# Patient Record
Sex: MALE | Race: OTHER | HISPANIC OR LATINO | Employment: UNEMPLOYED | URBAN - METROPOLITAN AREA
[De-identification: names, ages, dates, MRNs, and addresses within clinical notes are randomized per-mention and may not be internally consistent; named-entity substitution may affect disease eponyms.]

---

## 2022-07-23 ENCOUNTER — HOSPITAL ENCOUNTER (EMERGENCY)
Facility: HOSPITAL | Age: 42
Discharge: HOME | End: 2022-07-23
Attending: FAMILY MEDICINE

## 2022-07-23 VITALS
HEIGHT: 70 IN | RESPIRATION RATE: 16 BRPM | OXYGEN SATURATION: 99 % | SYSTOLIC BLOOD PRESSURE: 120 MMHG | HEART RATE: 77 BPM | DIASTOLIC BLOOD PRESSURE: 80 MMHG | TEMPERATURE: 97 F | BODY MASS INDEX: 29.51 KG/M2 | WEIGHT: 206.13 LBS

## 2022-07-23 DIAGNOSIS — L03.116 CELLULITIS OF LEFT LOWER EXTREMITY: Primary | ICD-10-CM

## 2022-07-23 LAB
ANION GAP SERPL CALC-SCNC: 8 MEQ/L
BASOPHILS # BLD AUTO: 0.05 X10(3)/MCL (ref 0–0.2)
BASOPHILS NFR BLD AUTO: 0.4 %
BUN SERPL-MCNC: 14 MG/DL (ref 8.9–20.6)
CALCIUM SERPL-MCNC: 10.3 MG/DL (ref 8.4–10.2)
CHLORIDE SERPL-SCNC: 105 MMOL/L (ref 98–107)
CO2 SERPL-SCNC: 27 MMOL/L (ref 22–29)
CREAT SERPL-MCNC: 0.86 MG/DL (ref 0.73–1.18)
CREAT/UREA NIT SERPL: 16
EOSINOPHIL # BLD AUTO: 0.23 X10(3)/MCL (ref 0–0.9)
EOSINOPHIL NFR BLD AUTO: 1.7 %
ERYTHROCYTE [DISTWIDTH] IN BLOOD BY AUTOMATED COUNT: 12.8 % (ref 11.5–17)
GLUCOSE SERPL-MCNC: 89 MG/DL (ref 74–100)
HCT VFR BLD AUTO: 42.1 % (ref 42–52)
HGB BLD-MCNC: 14.3 GM/DL (ref 14–18)
IMM GRANULOCYTES # BLD AUTO: 0.08 X10(3)/MCL (ref 0–0.04)
IMM GRANULOCYTES NFR BLD AUTO: 0.6 %
LYMPHOCYTES # BLD AUTO: 2.11 X10(3)/MCL (ref 0.6–4.6)
LYMPHOCYTES NFR BLD AUTO: 15.1 %
MCH RBC QN AUTO: 30.3 PG (ref 27–31)
MCHC RBC AUTO-ENTMCNC: 34 MG/DL (ref 33–36)
MCV RBC AUTO: 89.2 FL (ref 80–94)
MONOCYTES # BLD AUTO: 1.2 X10(3)/MCL (ref 0.1–1.3)
MONOCYTES NFR BLD AUTO: 8.6 %
NEUTROPHILS # BLD AUTO: 10.3 X10(3)/MCL (ref 2.1–9.2)
NEUTROPHILS NFR BLD AUTO: 73.6 %
NRBC BLD AUTO-RTO: 0 %
PLATELET # BLD AUTO: 250 X10(3)/MCL (ref 130–400)
PMV BLD AUTO: 10.2 FL (ref 7.4–10.4)
POTASSIUM SERPL-SCNC: 4 MMOL/L (ref 3.5–5.1)
RBC # BLD AUTO: 4.72 X10(6)/MCL (ref 4.7–6.1)
SODIUM SERPL-SCNC: 140 MMOL/L (ref 136–145)
WBC # SPEC AUTO: 13.9 X10(3)/MCL (ref 4.5–11.5)

## 2022-07-23 PROCEDURE — 36415 COLL VENOUS BLD VENIPUNCTURE: CPT | Performed by: FAMILY MEDICINE

## 2022-07-23 PROCEDURE — 85025 COMPLETE CBC W/AUTO DIFF WBC: CPT | Performed by: FAMILY MEDICINE

## 2022-07-23 PROCEDURE — 99284 EMERGENCY DEPT VISIT MOD MDM: CPT

## 2022-07-23 PROCEDURE — 80048 BASIC METABOLIC PNL TOTAL CA: CPT | Performed by: FAMILY MEDICINE

## 2022-07-23 RX ORDER — HYDROCODONE BITARTRATE AND ACETAMINOPHEN 5; 325 MG/1; MG/1
1 TABLET ORAL EVERY 6 HOURS PRN
Qty: 15 TABLET | Refills: 0 | Status: ON HOLD | OUTPATIENT
Start: 2022-07-23 | End: 2023-07-17 | Stop reason: HOSPADM

## 2022-07-23 RX ORDER — SULFAMETHOXAZOLE AND TRIMETHOPRIM 800; 160 MG/1; MG/1
1 TABLET ORAL 2 TIMES DAILY
Qty: 14 TABLET | Refills: 0 | Status: SHIPPED | OUTPATIENT
Start: 2022-07-23 | End: 2022-07-30

## 2022-07-23 RX ORDER — SULFAMETHOXAZOLE AND TRIMETHOPRIM 800; 160 MG/1; MG/1
1 TABLET ORAL 2 TIMES DAILY
Qty: 14 TABLET | Refills: 0 | Status: SHIPPED | OUTPATIENT
Start: 2022-07-23 | End: 2022-07-23 | Stop reason: SDUPTHER

## 2022-07-23 RX ORDER — OMEPRAZOLE 20 MG/1
20 TABLET, DELAYED RELEASE ORAL DAILY
COMMUNITY

## 2022-07-23 NOTE — ED PROVIDER NOTES
"Encounter Date: 7/23/2022       History     Chief Complaint   Patient presents with    Abscess    Cellulitis     Pt c/o Left upper thigh redness with pain. Warm to touch, he states 1 week ago today he attempted to remove what appeared to be a "pimple" No drainage from area since.      42 year old  male presents to the ED with complaint of left thigh pain  Patient reports " about a week ago I  had a pimple on my left thigh, I popped it and and the redness continued over a week."   Patient denies diet history of diabetes.  Patient denies fever, chills, weakness, dizziness, abdominal pain,CP, sob.     The history is provided by the patient. The history is limited by a language barrier. A  was used.   Abscess   This is a new problem. Illness onset: 1 week ago. The problem has been gradually worsening. The abscess is present on the left upper leg. The pain is at a severity of 3/10. The abscess is characterized by redness. Pertinent negatives include no fever, no vomiting and no sore throat.     Review of patient's allergies indicates:  No Known Allergies  Past Medical History:   Diagnosis Date    GERD (gastroesophageal reflux disease)      History reviewed. No pertinent surgical history.  History reviewed. No pertinent family history.  Social History     Tobacco Use    Smoking status: Never Smoker    Smokeless tobacco: Never Used   Substance Use Topics    Alcohol use: Never    Drug use: Not Currently     Review of Systems   Constitutional: Negative for fever.   HENT: Negative for sore throat.    Respiratory: Negative for shortness of breath.    Cardiovascular: Negative for chest pain.   Gastrointestinal: Negative for nausea and vomiting.   Genitourinary: Negative for dysuria.   Musculoskeletal: Negative for back pain.   Skin: Positive for rash.   Neurological: Negative for weakness.       Physical Exam     Initial Vitals [07/23/22 0817]   BP Pulse Resp Temp SpO2   120/80 77 16 97.3 °F " (36.3 °C) 99 %      MAP       --         Physical Exam    Nursing note and vitals reviewed.  Constitutional: He appears well-developed and well-nourished. No distress.   HENT:   Head: Normocephalic and atraumatic.   Eyes: Conjunctivae are normal.   Cardiovascular: Regular rhythm.   Pulmonary/Chest: Breath sounds normal.   Abdominal: Abdomen is soft. Bowel sounds are normal. There is no abdominal tenderness. There is no rebound and no guarding.   Musculoskeletal:         General: Normal range of motion.        Legs:       Comments: LLE- NVI, full active ROM with no difficulty, all adjacent joints- WNL      Neurological: He is alert and oriented to person, place, and time. He has normal strength.   Skin: Skin is warm and dry.   Left anterior thigh- 6cm above knee- pinpoint shavonne noted with erythema extending around pinpoint shavonne and proximally going to anterior thigh and medial thigh.Erythema does not touch groin/penis. No streaking redness. No erythema distally.  Lesion is warm to touch and indurated. No fluctuance noted.  Consistent with cellulitis at this time.     Psychiatric: He has a normal mood and affect. His behavior is normal. Judgment and thought content normal.         ED Course   Procedures  Labs Reviewed   BASIC METABOLIC PANEL - Abnormal; Notable for the following components:       Result Value    Calcium Level Total 10.3 (*)     All other components within normal limits   CBC WITH DIFFERENTIAL - Abnormal; Notable for the following components:    WBC 13.9 (*)     Neut # 10.3 (*)     IG# 0.08 (*)     All other components within normal limits   CBC W/ AUTO DIFFERENTIAL    Narrative:     The following orders were created for panel order CBC auto differential.  Procedure                               Abnormality         Status                     ---------                               -----------         ------                     CBC with Differential[[id number]]        Abnormal            Final result                  Please view results for these tests on the individual orders.   EXTRA TUBES    Narrative:     The following orders were created for panel order EXTRA TUBES.  Procedure                               Abnormality         Status                     ---------                               -----------         ------                     Gold Top Hold[[id number]]                                    In process                   Please view results for these tests on the individual orders.   GOLD TOP HOLD          Imaging Results    None          Medications - No data to display  Medical Decision Making:   Initial Assessment:   43 y/o with no past medical history presents with left thigh infection.  Patient not ill septic appearing.  ED Management:  Reviewed labs with patient.  Strict ER precautions have been given to the patient who verbalized understanding.  Patient is to return to the ER in 2-3 days for wound re-evaluation.  Patient is to return sooner should he develop fever, worsening pain, worsening redness or any other concerns.  Patient has been informed of this via .  All questions and concerns have been addressed at this time.  Patient is stable for discharge.             ED Course as of 07/23/22 1124   Sat Jul 23, 2022   1106 WBC(!): 13.9  Reviewed labs with patient.   Leukocytosis secondary to infection.   [AK]      ED Course User Index  [AK] Selina Murdock MD             Clinical Impression:   Final diagnoses:  [L03.116] Cellulitis of left lower extremity (Primary)          ED Disposition Condition    Discharge Stable        ED Prescriptions     Medication Sig Dispense Start Date End Date Auth. Provider    sulfamethoxazole-trimethoprim 800-160mg (BACTRIM DS) 800-160 mg Tab Take 1 tablet by mouth 2 (two) times daily. for 7 days 14 tablet 7/23/2022 7/30/2022 Selina Murdock MD    HYDROcodone-acetaminophen (NORCO) 5-325 mg per tablet Take 1 tablet by mouth every 6 (six) hours as needed for Pain. 15 tablet  7/23/2022  Selina Murdock MD        Follow-up Information     Follow up With Specialties Details Why Contact Info    [facility] Emergency Dept Emergency Medicine  As needed, If symptoms worsen [facility] [id number] [phone]    [facility] Internal Medicine Schedule an appointment as soon as possible for a visit in 1 week  [facility] [id number] [phone]           Seilna Murdock MD  07/23/22 1124

## 2022-07-30 ENCOUNTER — HOSPITAL ENCOUNTER (EMERGENCY)
Facility: HOSPITAL | Age: 42
Discharge: HOME | End: 2022-07-30
Attending: INTERNAL MEDICINE

## 2022-07-30 VITALS
OXYGEN SATURATION: 99 % | HEIGHT: 70 IN | TEMPERATURE: 98 F | RESPIRATION RATE: 18 BRPM | WEIGHT: 216.06 LBS | DIASTOLIC BLOOD PRESSURE: 78 MMHG | BODY MASS INDEX: 30.93 KG/M2 | HEART RATE: 76 BPM | SYSTOLIC BLOOD PRESSURE: 125 MMHG

## 2022-07-30 DIAGNOSIS — L03.116 CELLULITIS OF LEFT LOWER EXTREMITY: Primary | ICD-10-CM

## 2022-07-30 PROCEDURE — 99284 EMERGENCY DEPT VISIT MOD MDM: CPT

## 2022-07-30 RX ORDER — DICLOFENAC SODIUM 75 MG/1
75 TABLET, DELAYED RELEASE ORAL 2 TIMES DAILY PRN
Qty: 20 TABLET | Refills: 0 | Status: SHIPPED | OUTPATIENT
Start: 2022-07-30

## 2022-07-30 RX ORDER — CEPHALEXIN 500 MG/1
500 CAPSULE ORAL EVERY 8 HOURS
Qty: 30 CAPSULE | Refills: 0 | Status: SHIPPED | OUTPATIENT
Start: 2022-07-30 | End: 2022-08-09

## 2022-07-30 NOTE — ED PROVIDER NOTES
Encounter Date: 7/30/2022       History     Chief Complaint   Patient presents with    Abscess     Abscess on lt upper leg      The patient presents for a cellulitis re-evaluation.  Previous visit(s) to the emergency department on 7/23.  Previous treatment: bactrim.  Symptoms since visit: pain mild.  The course/duration of symptoms is improving.  Associated symptoms: none.  Video  used.          Review of patient's allergies indicates:  No Known Allergies  Past Medical History:   Diagnosis Date    GERD (gastroesophageal reflux disease)      History reviewed. No pertinent surgical history.  History reviewed. No pertinent family history.  Social History     Tobacco Use    Smoking status: Never Smoker    Smokeless tobacco: Never Used   Substance Use Topics    Alcohol use: Never    Drug use: Not Currently     Review of Systems   Constitutional: Negative for fever.   HENT: Negative for sore throat.    Respiratory: Negative for shortness of breath.    Cardiovascular: Negative for chest pain.   Gastrointestinal: Negative for nausea.   Genitourinary: Negative for dysuria.   Musculoskeletal: Negative for back pain.   Skin: Positive for wound. Negative for rash.   Neurological: Negative for weakness.   Hematological: Does not bruise/bleed easily.   All other systems reviewed and are negative.      Physical Exam     Initial Vitals [07/30/22 1545]   BP Pulse Resp Temp SpO2   125/78 76 18 98.1 °F (36.7 °C) 99 %      MAP       --         Physical Exam    Nursing note and vitals reviewed.  Constitutional: He appears well-developed and well-nourished.   HENT:   Head: Normocephalic and atraumatic.   Neck: Neck supple.   Normal range of motion.  Cardiovascular: Normal rate, regular rhythm, normal heart sounds and intact distal pulses.   Pulmonary/Chest: Breath sounds normal.   Abdominal: Abdomen is soft. Bowel sounds are normal.   Musculoskeletal:         General: Normal range of motion.      Cervical back: Normal  range of motion and neck supple.     Neurological: He is alert and oriented to person, place, and time. He has normal strength.   Skin: Skin is warm and dry.   Area to L thigh with mild erythema and induration - he reports improvement, no drainage or fluctuance   Psychiatric: He has a normal mood and affect.         ED Course   Procedures  Labs Reviewed - No data to display       Imaging Results    None          Medications - No data to display  Medical Decision Making:   History:   Old Records Summarized: records from clinic visits and records from previous admission(s).  4:25 PM DISPOSITION: The patient is resting comfortably in no acute distress.  He is hemodynamically stable and is without objective evidence for acute process requiring urgent intervention or hospitalization. I provided counseling to patient with regard to condition, the treatment plan, specific conditions for return, and the importance of follow up. Detailed written and verbal instructions provided to patient and he expressed a verbal understanding of the discharge instructions and overall management plan. Reiterated the importance of medication administration and safety and advised patient to follow up with primary care provider in 3-5 days or sooner if needed.  Answered questions at this time. The patient is stable for discharge.                         Clinical Impression:   Final diagnoses:  [L03.116] Cellulitis of left lower extremity (Primary)          ED Disposition Condition    Discharge Stable        ED Prescriptions     Medication Sig Dispense Start Date End Date Auth. Provider    cephALEXin (KEFLEX) 500 MG capsule Take 1 capsule (500 mg total) by mouth every 8 (eight) hours. for 10 days 30 capsule 7/30/2022 8/9/2022 JAKE Fam    diclofenac (VOLTAREN) 75 MG EC tablet Take 1 tablet (75 mg total) by mouth 2 (two) times daily as needed (pain). 20 tablet 7/30/2022  JAKE Fam        Follow-up Information     Follow up With  Specialties Details Why Contact Info    return to ER in 3 days for a wound recheck        [facility] Emergency Dept Emergency Medicine  If symptoms worsen [address] [facility] [address] [phone]           Peter Alejo, JAKE  07/30/22 6275

## 2023-07-14 ENCOUNTER — ANESTHESIA (OUTPATIENT)
Dept: SURGERY | Facility: HOSPITAL | Age: 43
DRG: 603 | End: 2023-07-14

## 2023-07-14 ENCOUNTER — ANESTHESIA EVENT (OUTPATIENT)
Dept: SURGERY | Facility: HOSPITAL | Age: 43
DRG: 603 | End: 2023-07-14

## 2023-07-14 ENCOUNTER — HOSPITAL ENCOUNTER (EMERGENCY)
Facility: HOSPITAL | Age: 43
Discharge: ANOTHER HOSPITAL | End: 2023-07-14
Attending: INTERNAL MEDICINE

## 2023-07-14 ENCOUNTER — HOSPITAL ENCOUNTER (INPATIENT)
Facility: HOSPITAL | Age: 43
LOS: 3 days | Discharge: HOME | DRG: 603 | End: 2023-07-17
Attending: INTERNAL MEDICINE | Admitting: INTERNAL MEDICINE

## 2023-07-14 VITALS
SYSTOLIC BLOOD PRESSURE: 140 MMHG | RESPIRATION RATE: 18 BRPM | DIASTOLIC BLOOD PRESSURE: 85 MMHG | TEMPERATURE: 98 F | OXYGEN SATURATION: 100 % | BODY MASS INDEX: 31.86 KG/M2 | WEIGHT: 222.56 LBS | HEART RATE: 82 BPM

## 2023-07-14 DIAGNOSIS — L08.9: ICD-10-CM

## 2023-07-14 DIAGNOSIS — L03.119 CELLULITIS OF HAND: Primary | ICD-10-CM

## 2023-07-14 DIAGNOSIS — S61.452A: ICD-10-CM

## 2023-07-14 DIAGNOSIS — M65.849 EXTENSOR TENOSYNOVITIS OF FINGER: Primary | ICD-10-CM

## 2023-07-14 DIAGNOSIS — R79.82 ELEVATED C-REACTIVE PROTEIN (CRP): ICD-10-CM

## 2023-07-14 DIAGNOSIS — M65.9 TENOSYNOVITIS: ICD-10-CM

## 2023-07-14 DIAGNOSIS — L03.012 CELLULITIS OF LEFT RING FINGER: ICD-10-CM

## 2023-07-14 PROBLEM — L02.512 ABSCESS OF FINGER OF LEFT HAND: Status: ACTIVE | Noted: 2023-07-14

## 2023-07-14 LAB
ALBUMIN SERPL-MCNC: 3.9 G/DL (ref 3.5–5)
ALBUMIN/GLOB SERPL: 1.3 RATIO (ref 1.1–2)
ALP SERPL-CCNC: 75 UNIT/L (ref 40–150)
ALT SERPL-CCNC: 39 UNIT/L (ref 0–55)
AST SERPL-CCNC: 19 UNIT/L (ref 5–34)
BASOPHILS # BLD AUTO: 0.04 X10(3)/MCL
BASOPHILS NFR BLD AUTO: 0.2 %
BILIRUBIN DIRECT+TOT PNL SERPL-MCNC: 0.4 MG/DL
BUN SERPL-MCNC: 9.6 MG/DL (ref 8.9–20.6)
CALCIUM SERPL-MCNC: 9.6 MG/DL (ref 8.4–10.2)
CHLORIDE SERPL-SCNC: 108 MMOL/L (ref 98–107)
CO2 SERPL-SCNC: 26 MMOL/L (ref 22–29)
CREAT SERPL-MCNC: 0.84 MG/DL (ref 0.73–1.18)
CRP SERPL-MCNC: 115.6 MG/L
EOSINOPHIL # BLD AUTO: 0.13 X10(3)/MCL (ref 0–0.9)
EOSINOPHIL NFR BLD AUTO: 0.6 %
ERYTHROCYTE [DISTWIDTH] IN BLOOD BY AUTOMATED COUNT: 13.6 % (ref 11.5–17)
ERYTHROCYTE [SEDIMENTATION RATE] IN BLOOD: 24 MM/HR (ref 0–15)
GFR SERPLBLD CREATININE-BSD FMLA CKD-EPI: >60 MLS/MIN/1.73/M2
GLOBULIN SER-MCNC: 3 GM/DL (ref 2.4–3.5)
GLUCOSE SERPL-MCNC: 85 MG/DL (ref 74–100)
HCT VFR BLD AUTO: 43.6 % (ref 42–52)
HGB BLD-MCNC: 15.2 G/DL (ref 14–18)
IMM GRANULOCYTES # BLD AUTO: 0.27 X10(3)/MCL (ref 0–0.04)
IMM GRANULOCYTES NFR BLD AUTO: 1.2 %
LYMPHOCYTES # BLD AUTO: 1.81 X10(3)/MCL (ref 0.6–4.6)
LYMPHOCYTES NFR BLD AUTO: 8.2 %
MCH RBC QN AUTO: 30.8 PG (ref 27–31)
MCHC RBC AUTO-ENTMCNC: 34.9 G/DL (ref 33–36)
MCV RBC AUTO: 88.3 FL (ref 80–94)
MONOCYTES # BLD AUTO: 2.14 X10(3)/MCL (ref 0.1–1.3)
MONOCYTES NFR BLD AUTO: 9.7 %
NEUTROPHILS # BLD AUTO: 17.66 X10(3)/MCL (ref 2.1–9.2)
NEUTROPHILS NFR BLD AUTO: 80.1 %
NRBC BLD AUTO-RTO: 0 %
PLATELET # BLD AUTO: 217 X10(3)/MCL (ref 130–400)
PLATELET # BLD EST: ADEQUATE 10*3/UL
PMV BLD AUTO: 10.5 FL (ref 7.4–10.4)
POTASSIUM SERPL-SCNC: 3.6 MMOL/L (ref 3.5–5.1)
PROT SERPL-MCNC: 6.9 GM/DL (ref 6.4–8.3)
RBC # BLD AUTO: 4.94 X10(6)/MCL (ref 4.7–6.1)
RBC MORPH BLD: NORMAL
SODIUM SERPL-SCNC: 142 MMOL/L (ref 136–145)
WBC # SPEC AUTO: 22.05 X10(3)/MCL (ref 4.5–11.5)

## 2023-07-14 PROCEDURE — 94761 N-INVAS EAR/PLS OXIMETRY MLT: CPT

## 2023-07-14 PROCEDURE — 10061 I&D ABSCESS COMP/MULTIPLE: CPT | Mod: ,,, | Performed by: ORTHOPAEDIC SURGERY

## 2023-07-14 PROCEDURE — 63600175 PHARM REV CODE 636 W HCPCS: Performed by: INTERNAL MEDICINE

## 2023-07-14 PROCEDURE — 25000003 PHARM REV CODE 250: Performed by: PHYSICIAN ASSISTANT

## 2023-07-14 PROCEDURE — D9220A PRA ANESTHESIA: Mod: ,,, | Performed by: ANESTHESIOLOGY

## 2023-07-14 PROCEDURE — 63600175 PHARM REV CODE 636 W HCPCS: Performed by: ANESTHESIOLOGY

## 2023-07-14 PROCEDURE — 26011 DRAINAGE OF FINGER ABSCESS: CPT | Mod: 51,F3,, | Performed by: ORTHOPAEDIC SURGERY

## 2023-07-14 PROCEDURE — 87205 SMEAR GRAM STAIN: CPT | Performed by: ORTHOPAEDIC SURGERY

## 2023-07-14 PROCEDURE — 63600175 PHARM REV CODE 636 W HCPCS: Performed by: PHYSICIAN ASSISTANT

## 2023-07-14 PROCEDURE — 96365 THER/PROPH/DIAG IV INF INIT: CPT

## 2023-07-14 PROCEDURE — 25000003 PHARM REV CODE 250: Performed by: NURSE ANESTHETIST, CERTIFIED REGISTERED

## 2023-07-14 PROCEDURE — 36000707: Performed by: ORTHOPAEDIC SURGERY

## 2023-07-14 PROCEDURE — 99285 EMERGENCY DEPT VISIT HI MDM: CPT | Mod: 25,27

## 2023-07-14 PROCEDURE — 37000008 HC ANESTHESIA 1ST 15 MINUTES: Performed by: ORTHOPAEDIC SURGERY

## 2023-07-14 PROCEDURE — 25000003 PHARM REV CODE 250

## 2023-07-14 PROCEDURE — 85025 COMPLETE CBC W/AUTO DIFF WBC: CPT | Performed by: PHYSICIAN ASSISTANT

## 2023-07-14 PROCEDURE — 99223 1ST HOSP IP/OBS HIGH 75: CPT | Mod: 25,,,

## 2023-07-14 PROCEDURE — 36000706: Performed by: ORTHOPAEDIC SURGERY

## 2023-07-14 PROCEDURE — 99223 PR INITIAL HOSPITAL CARE,LEVL III: ICD-10-PCS | Mod: 25,,,

## 2023-07-14 PROCEDURE — 71000033 HC RECOVERY, INTIAL HOUR: Performed by: ORTHOPAEDIC SURGERY

## 2023-07-14 PROCEDURE — 10061 PR DRAIN SKIN ABSCESS COMPLIC: ICD-10-PCS | Mod: ,,, | Performed by: ORTHOPAEDIC SURGERY

## 2023-07-14 PROCEDURE — 87075 CULTR BACTERIA EXCEPT BLOOD: CPT | Performed by: ORTHOPAEDIC SURGERY

## 2023-07-14 PROCEDURE — 87070 CULTURE OTHR SPECIMN AEROBIC: CPT | Performed by: ORTHOPAEDIC SURGERY

## 2023-07-14 PROCEDURE — 63600175 PHARM REV CODE 636 W HCPCS: Performed by: NURSE ANESTHETIST, CERTIFIED REGISTERED

## 2023-07-14 PROCEDURE — 99285 EMERGENCY DEPT VISIT HI MDM: CPT | Mod: 25

## 2023-07-14 PROCEDURE — 96372 THER/PROPH/DIAG INJ SC/IM: CPT | Performed by: PHYSICIAN ASSISTANT

## 2023-07-14 PROCEDURE — 86140 C-REACTIVE PROTEIN: CPT | Performed by: PHYSICIAN ASSISTANT

## 2023-07-14 PROCEDURE — 37000009 HC ANESTHESIA EA ADD 15 MINS: Performed by: ORTHOPAEDIC SURGERY

## 2023-07-14 PROCEDURE — 11000001 HC ACUTE MED/SURG PRIVATE ROOM

## 2023-07-14 PROCEDURE — 85652 RBC SED RATE AUTOMATED: CPT | Performed by: PHYSICIAN ASSISTANT

## 2023-07-14 PROCEDURE — 80053 COMPREHEN METABOLIC PANEL: CPT | Performed by: PHYSICIAN ASSISTANT

## 2023-07-14 PROCEDURE — 87102 FUNGUS ISOLATION CULTURE: CPT | Performed by: ORTHOPAEDIC SURGERY

## 2023-07-14 PROCEDURE — D9220A PRA ANESTHESIA: ICD-10-PCS | Mod: ,,, | Performed by: ANESTHESIOLOGY

## 2023-07-14 PROCEDURE — 63600175 PHARM REV CODE 636 W HCPCS

## 2023-07-14 PROCEDURE — 26011 PR DRAIN FINGER ABSCESS,COMPLICATED: ICD-10-PCS | Mod: 51,F3,, | Performed by: ORTHOPAEDIC SURGERY

## 2023-07-14 PROCEDURE — 87040 BLOOD CULTURE FOR BACTERIA: CPT | Performed by: PHYSICIAN ASSISTANT

## 2023-07-14 PROCEDURE — 51798 US URINE CAPACITY MEASURE: CPT

## 2023-07-14 PROCEDURE — 25000003 PHARM REV CODE 250: Performed by: INTERNAL MEDICINE

## 2023-07-14 RX ORDER — ONDANSETRON 2 MG/ML
4 INJECTION INTRAMUSCULAR; INTRAVENOUS EVERY 8 HOURS PRN
Status: DISCONTINUED | OUTPATIENT
Start: 2023-07-14 | End: 2023-07-17 | Stop reason: HOSPADM

## 2023-07-14 RX ORDER — SODIUM CHLORIDE 0.9 % (FLUSH) 0.9 %
10 SYRINGE (ML) INJECTION EVERY 12 HOURS PRN
Status: DISCONTINUED | OUTPATIENT
Start: 2023-07-14 | End: 2023-07-17 | Stop reason: HOSPADM

## 2023-07-14 RX ORDER — ONDANSETRON 2 MG/ML
4 INJECTION INTRAMUSCULAR; INTRAVENOUS DAILY PRN
Status: DISCONTINUED | OUTPATIENT
Start: 2023-07-14 | End: 2023-07-14

## 2023-07-14 RX ORDER — MEPERIDINE HYDROCHLORIDE 25 MG/ML
12.5 INJECTION INTRAMUSCULAR; INTRAVENOUS; SUBCUTANEOUS EVERY 10 MIN PRN
Status: DISCONTINUED | OUTPATIENT
Start: 2023-07-14 | End: 2023-07-14 | Stop reason: HOSPADM

## 2023-07-14 RX ORDER — SODIUM CITRATE AND CITRIC ACID MONOHYDRATE 334; 500 MG/5ML; MG/5ML
SOLUTION ORAL
Status: COMPLETED
Start: 2023-07-14 | End: 2023-07-14

## 2023-07-14 RX ORDER — CLINDAMYCIN PHOSPHATE 600 MG/50ML
600 INJECTION, SOLUTION INTRAVENOUS
Status: COMPLETED | OUTPATIENT
Start: 2023-07-14 | End: 2023-07-14

## 2023-07-14 RX ORDER — KETOROLAC TROMETHAMINE 30 MG/ML
INJECTION, SOLUTION INTRAMUSCULAR; INTRAVENOUS
Status: DISCONTINUED | OUTPATIENT
Start: 2023-07-14 | End: 2023-07-14

## 2023-07-14 RX ORDER — TALC
6 POWDER (GRAM) TOPICAL NIGHTLY PRN
Status: DISCONTINUED | OUTPATIENT
Start: 2023-07-14 | End: 2023-07-17 | Stop reason: HOSPADM

## 2023-07-14 RX ORDER — PROCHLORPERAZINE EDISYLATE 5 MG/ML
5 INJECTION INTRAMUSCULAR; INTRAVENOUS EVERY 30 MIN PRN
Status: DISCONTINUED | OUTPATIENT
Start: 2023-07-14 | End: 2023-07-14 | Stop reason: HOSPADM

## 2023-07-14 RX ORDER — MIDAZOLAM HYDROCHLORIDE 1 MG/ML
2 INJECTION INTRAMUSCULAR; INTRAVENOUS
Status: CANCELLED | OUTPATIENT
Start: 2023-07-14 | End: 2023-07-14

## 2023-07-14 RX ORDER — HYDROMORPHONE HYDROCHLORIDE 2 MG/ML
0.2 INJECTION, SOLUTION INTRAMUSCULAR; INTRAVENOUS; SUBCUTANEOUS EVERY 5 MIN PRN
Status: DISCONTINUED | OUTPATIENT
Start: 2023-07-14 | End: 2023-07-14 | Stop reason: HOSPADM

## 2023-07-14 RX ORDER — SODIUM CHLORIDE 0.9 % (FLUSH) 0.9 %
10 SYRINGE (ML) INJECTION
Status: DISCONTINUED | OUTPATIENT
Start: 2023-07-14 | End: 2023-07-17 | Stop reason: HOSPADM

## 2023-07-14 RX ORDER — CLINDAMYCIN PHOSPHATE 600 MG/50ML
600 INJECTION, SOLUTION INTRAVENOUS
Status: DISCONTINUED | OUTPATIENT
Start: 2023-07-14 | End: 2023-07-14

## 2023-07-14 RX ORDER — HYDROCODONE BITARTRATE AND ACETAMINOPHEN 5; 325 MG/1; MG/1
1 TABLET ORAL EVERY 6 HOURS PRN
Status: DISCONTINUED | OUTPATIENT
Start: 2023-07-14 | End: 2023-07-15

## 2023-07-14 RX ORDER — DIPHENHYDRAMINE HYDROCHLORIDE 50 MG/ML
25 INJECTION INTRAMUSCULAR; INTRAVENOUS EVERY 6 HOURS PRN
Status: DISCONTINUED | OUTPATIENT
Start: 2023-07-14 | End: 2023-07-14 | Stop reason: HOSPADM

## 2023-07-14 RX ORDER — FENTANYL CITRATE 50 UG/ML
INJECTION, SOLUTION INTRAMUSCULAR; INTRAVENOUS
Status: DISCONTINUED | OUTPATIENT
Start: 2023-07-14 | End: 2023-07-14

## 2023-07-14 RX ORDER — HYDROMORPHONE HYDROCHLORIDE 2 MG/ML
1 INJECTION, SOLUTION INTRAMUSCULAR; INTRAVENOUS; SUBCUTANEOUS EVERY 4 HOURS PRN
Status: DISCONTINUED | OUTPATIENT
Start: 2023-07-14 | End: 2023-07-17 | Stop reason: HOSPADM

## 2023-07-14 RX ORDER — KETOROLAC TROMETHAMINE 30 MG/ML
15 INJECTION, SOLUTION INTRAMUSCULAR; INTRAVENOUS EVERY 6 HOURS PRN
Status: DISCONTINUED | OUTPATIENT
Start: 2023-07-14 | End: 2023-07-17 | Stop reason: HOSPADM

## 2023-07-14 RX ORDER — ACETAMINOPHEN 10 MG/ML
1000 INJECTION, SOLUTION INTRAVENOUS ONCE
Status: COMPLETED | OUTPATIENT
Start: 2023-07-14 | End: 2023-07-14

## 2023-07-14 RX ORDER — ACETAMINOPHEN 325 MG/1
650 TABLET ORAL EVERY 8 HOURS PRN
Status: DISCONTINUED | OUTPATIENT
Start: 2023-07-14 | End: 2023-07-17 | Stop reason: HOSPADM

## 2023-07-14 RX ORDER — CLONIDINE HYDROCHLORIDE 0.1 MG/1
0.1 TABLET ORAL EVERY 4 HOURS PRN
Status: DISCONTINUED | OUTPATIENT
Start: 2023-07-14 | End: 2023-07-17 | Stop reason: HOSPADM

## 2023-07-14 RX ORDER — KETOROLAC TROMETHAMINE 30 MG/ML
30 INJECTION, SOLUTION INTRAMUSCULAR; INTRAVENOUS
Status: COMPLETED | OUTPATIENT
Start: 2023-07-14 | End: 2023-07-14

## 2023-07-14 RX ORDER — SODIUM CITRATE AND CITRIC ACID MONOHYDRATE 334; 500 MG/5ML; MG/5ML
30 SOLUTION ORAL ONCE
Status: CANCELLED | OUTPATIENT
Start: 2023-07-14 | End: 2023-07-14

## 2023-07-14 RX ORDER — LIDOCAINE HYDROCHLORIDE 10 MG/ML
INJECTION, SOLUTION EPIDURAL; INFILTRATION; INTRACAUDAL; PERINEURAL
Status: DISCONTINUED | OUTPATIENT
Start: 2023-07-14 | End: 2023-07-14

## 2023-07-14 RX ORDER — LIDOCAINE HYDROCHLORIDE 10 MG/ML
1 INJECTION, SOLUTION EPIDURAL; INFILTRATION; INTRACAUDAL; PERINEURAL ONCE
Status: CANCELLED | OUTPATIENT
Start: 2023-07-14 | End: 2023-07-14

## 2023-07-14 RX ORDER — VANCOMYCIN HYDROCHLORIDE 1 G/20ML
INJECTION, POWDER, LYOPHILIZED, FOR SOLUTION INTRAVENOUS
Status: DISPENSED
Start: 2023-07-14 | End: 2023-07-15

## 2023-07-14 RX ORDER — SODIUM CHLORIDE, SODIUM LACTATE, POTASSIUM CHLORIDE, CALCIUM CHLORIDE 600; 310; 30; 20 MG/100ML; MG/100ML; MG/100ML; MG/100ML
INJECTION, SOLUTION INTRAVENOUS CONTINUOUS
Status: DISCONTINUED | OUTPATIENT
Start: 2023-07-14 | End: 2023-07-15

## 2023-07-14 RX ORDER — HYDROCODONE BITARTRATE AND ACETAMINOPHEN 5; 325 MG/1; MG/1
1 TABLET ORAL
Status: COMPLETED | OUTPATIENT
Start: 2023-07-14 | End: 2023-07-14

## 2023-07-14 RX ORDER — PROPOFOL 10 MG/ML
INJECTION, EMULSION INTRAVENOUS
Status: DISCONTINUED | OUTPATIENT
Start: 2023-07-14 | End: 2023-07-14

## 2023-07-14 RX ORDER — SODIUM CHLORIDE, SODIUM GLUCONATE, SODIUM ACETATE, POTASSIUM CHLORIDE AND MAGNESIUM CHLORIDE 30; 37; 368; 526; 502 MG/100ML; MG/100ML; MG/100ML; MG/100ML; MG/100ML
INJECTION, SOLUTION INTRAVENOUS CONTINUOUS
Status: CANCELLED | OUTPATIENT
Start: 2023-07-14 | End: 2023-08-13

## 2023-07-14 RX ORDER — DEXAMETHASONE SODIUM PHOSPHATE 4 MG/ML
INJECTION, SOLUTION INTRA-ARTICULAR; INTRALESIONAL; INTRAMUSCULAR; INTRAVENOUS; SOFT TISSUE
Status: DISCONTINUED | OUTPATIENT
Start: 2023-07-14 | End: 2023-07-14

## 2023-07-14 RX ADMIN — CLINDAMYCIN PHOSPHATE 600 MG: 600 INJECTION, SOLUTION INTRAVENOUS at 10:07

## 2023-07-14 RX ADMIN — ACETAMINOPHEN 1000 MG: 10 INJECTION INTRAVENOUS at 04:07

## 2023-07-14 RX ADMIN — SODIUM CHLORIDE, SODIUM GLUCONATE, SODIUM ACETATE, POTASSIUM CHLORIDE AND MAGNESIUM CHLORIDE: 526; 502; 368; 37; 30 INJECTION, SOLUTION INTRAVENOUS at 03:07

## 2023-07-14 RX ADMIN — PIPERACILLIN SODIUM AND TAZOBACTAM SODIUM 4.5 G: 4; .5 INJECTION, POWDER, LYOPHILIZED, FOR SOLUTION INTRAVENOUS at 05:07

## 2023-07-14 RX ADMIN — HYDROCODONE BITARTRATE AND ACETAMINOPHEN 1 TABLET: 5; 325 TABLET ORAL at 07:07

## 2023-07-14 RX ADMIN — FENTANYL CITRATE 100 MCG: 50 INJECTION, SOLUTION INTRAMUSCULAR; INTRAVENOUS at 03:07

## 2023-07-14 RX ADMIN — DEXTROSE 2 G: 50 INJECTION, SOLUTION INTRAVENOUS at 03:07

## 2023-07-14 RX ADMIN — DEXAMETHASONE SODIUM PHOSPHATE 4 MG: 4 INJECTION, SOLUTION INTRA-ARTICULAR; INTRALESIONAL; INTRAMUSCULAR; INTRAVENOUS; SOFT TISSUE at 03:07

## 2023-07-14 RX ADMIN — HYDROMORPHONE HYDROCHLORIDE 0.2 MG: 2 INJECTION INTRAMUSCULAR; INTRAVENOUS; SUBCUTANEOUS at 04:07

## 2023-07-14 RX ADMIN — KETOROLAC TROMETHAMINE 30 MG: 30 INJECTION, SOLUTION INTRAMUSCULAR; INTRAVENOUS at 09:07

## 2023-07-14 RX ADMIN — PROPOFOL 180 MG: 10 INJECTION, EMULSION INTRAVENOUS at 03:07

## 2023-07-14 RX ADMIN — VANCOMYCIN HYDROCHLORIDE 1000 MG: 1 INJECTION, POWDER, LYOPHILIZED, FOR SOLUTION INTRAVENOUS at 08:07

## 2023-07-14 RX ADMIN — KETOROLAC TROMETHAMINE 30 MG: 30 INJECTION, SOLUTION INTRAMUSCULAR at 04:07

## 2023-07-14 RX ADMIN — SODIUM CITRATE AND CITRIC ACID MONOHYDRATE 30 ML: 500; 334 SOLUTION ORAL at 03:07

## 2023-07-14 RX ADMIN — LIDOCAINE HYDROCHLORIDE 4 ML: 10 INJECTION, SOLUTION EPIDURAL; INFILTRATION; INTRACAUDAL; PERINEURAL at 03:07

## 2023-07-14 RX ADMIN — HYDROCODONE BITARTRATE AND ACETAMINOPHEN 1 TABLET: 5; 325 TABLET ORAL at 09:07

## 2023-07-14 RX ADMIN — VANCOMYCIN HYDROCHLORIDE 1000 MG: 1 INJECTION, POWDER, LYOPHILIZED, FOR SOLUTION INTRAVENOUS at 06:07

## 2023-07-14 NOTE — ED PROVIDER NOTES
"     Source of History:  Patient, mild limitations Moldovan speaking,  used     Chief complaint:  Insect Bite (Pt states getting bit by what he thinks is a spider. Pt went to [facility] for treatment and was sent here by [facility] to be seen by a specialist. Per the Patient)      HPI:  Sonya Henderson is a 43 y.o. male presenting with Insect Bite (Pt states getting bit by what he thinks is a spider. Pt went to [facility] for treatment and was sent here by [facility] to be seen by a specialist. Per the Patient)       42yo M employed as , presents as ED to ED transfer for ortho services, Dr Hendricks. Dx with extensor tenosynovitis at [facility] ED. Given clindamycin 600 mg, tetanus UTD. 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   Patient presents for evaluation of a possible skin infection. Onset of symptoms was 3 days ago, with rapidly worsening symptoms since that time. Symptoms include moderate pain and erythema located left hand . There is a history of trauma to the area. Treatment to date has included  see above      Review of Systems   Constitutional symptoms:  Negative except as documented in HPI.   Skin symptoms:  Negative except as documented in HPI.   HEENT symptoms:  Negative except as documented in HPI.   Respiratory symptoms:  Negative except as documented in HPI.   Cardiovascular symptoms:  Negative except as documented in HPI.   Gastrointestinal symptoms:  Negative except as documented in HPI.    Genitourinary symptoms:  Negative except as documented in HPI.   Musculoskeletal symptoms:  Negative except as documented in HPI.   Neurologic symptoms:  Negative except as documented in HPI.   Psychiatric symptoms:  Negative except as documented in HPI.   Allergy/immunologic symptoms:  Negative except as documented in HPI.             Additional review of systems information: All other systems reviewed and otherwise " "negative.      Review of patient's allergies indicates:  No Known Allergies    PMH:  As per HPI and below:    Past Medical History:   Diagnosis Date    GERD (gastroesophageal reflux disease)         No family history on file.    No past surgical history on file.    Social History     Tobacco Use    Smoking status: Never    Smokeless tobacco: Never   Substance Use Topics    Alcohol use: Never    Drug use: Not Currently       There is no problem list on file for this patient.       Physical Exam:    BP (!) 142/77   Pulse 78   Temp 98.2 °F (36.8 °C)   Resp 20   Ht 5' 8" (1.727 m)   Wt 100.7 kg (222 lb)   SpO2 99%   BMI 33.75 kg/m²     Nursing note and vital signs reviewed.    General:  Alert, no acute distress.   Skin: No cyanosis, see picture below, +abscess left ring finger with impressive cellulitis into dorsal surface of hand  HEENT: Normocephalic and atraumatic, Vision unchanged, Pupils symmetric, No icterus , Nasal mucosa is pink and moist  Cardiovascular:  Regular rate and rhythm, No edema  Chest Wall: No deformity, equal chest rise  Respiratory:  Lungs are clear to auscultation, respirations are non-labored.    Musculoskeletal:  No deformity, +extensor deficit of left ring finger  Gastrointestinal:  Soft, Non distended  Neurological:  Alert and oriented, normal motor observed, normal speech observed.    Psychiatric:  Cooperative, appropriate mood & affect.         Media Information        Labs that have been ordered have been independently reviewed and interpreted by myself.     Old Chart Reviewed.      Initial Impression/ Differential Dx:  Cellulitis, abscess, necrotizing fasciitis, osteomyelitis, septic joint, MRSA, allergic/contact dermatitis, local trauma/contusion      MDM:      Reviewed Nurses Note.    Reviewed Pertinent old records.    Orders Placed This Encounter    Admit to Inpatient    Case Request Operating Room: IRRIGATION AND DEBRIDEMENT, UPPER EXTREMITY                    Labs Reviewed - No " data to display       No orders to display        Admission on 07/14/2023, Discharged on 07/14/2023   Component Date Value Ref Range Status    Sodium Level 07/14/2023 142  136 - 145 mmol/L Final    Potassium Level 07/14/2023 3.6  3.5 - 5.1 mmol/L Final    Chloride 07/14/2023 108 (H)  98 - 107 mmol/L Final    Carbon Dioxide 07/14/2023 26  22 - 29 mmol/L Final    Glucose Level 07/14/2023 85  74 - 100 mg/dL Final    Blood Urea Nitrogen 07/14/2023 9.6  8.9 - 20.6 mg/dL Final    Creatinine 07/14/2023 0.84  0.73 - 1.18 mg/dL Final    Calcium Level Total 07/14/2023 9.6  8.4 - 10.2 mg/dL Final    Protein Total 07/14/2023 6.9  6.4 - 8.3 gm/dL Final    Albumin Level 07/14/2023 3.9  3.5 - 5.0 g/dL Final    Globulin 07/14/2023 3.0  2.4 - 3.5 gm/dL Final    Albumin/Globulin Ratio 07/14/2023 1.3  1.1 - 2.0 ratio Final    Bilirubin Total 07/14/2023 0.4  <=1.5 mg/dL Final    Alkaline Phosphatase 07/14/2023 75  40 - 150 unit/L Final    Alanine Aminotransferase 07/14/2023 39  0 - 55 unit/L Final    Aspartate Aminotransferase 07/14/2023 19  5 - 34 unit/L Final    eGFR 07/14/2023 >60  mls/min/1.73/m2 Final    Sed Rate 07/14/2023 24 (H)  0 - 15 mm/hr Final    C-Reactive Protein 07/14/2023 115.60 (H)  <5.00 mg/L Final    WBC 07/14/2023 22.05 (H)  4.50 - 11.50 x10(3)/mcL Final    RBC 07/14/2023 4.94  4.70 - 6.10 x10(6)/mcL Final    Hgb 07/14/2023 15.2  14.0 - 18.0 g/dL Final    Hct 07/14/2023 43.6  42.0 - 52.0 % Final    MCV 07/14/2023 88.3  80.0 - 94.0 fL Final    MCH 07/14/2023 30.8  27.0 - 31.0 pg Final    MCHC 07/14/2023 34.9  33.0 - 36.0 g/dL Final    RDW 07/14/2023 13.6  11.5 - 17.0 % Final    Platelet 07/14/2023 217  130 - 400 x10(3)/mcL Final    MPV 07/14/2023 10.5 (H)  7.4 - 10.4 fL Final    Neut % 07/14/2023 80.1  % Final    Lymph % 07/14/2023 8.2  % Final    Mono % 07/14/2023 9.7  % Final    Eos % 07/14/2023 0.6  % Final    Basophil % 07/14/2023 0.2  % Final    Lymph # 07/14/2023 1.81  0.6 - 4.6 x10(3)/mcL Final    Neut #  07/14/2023 17.66 (H)  2.1 - 9.2 x10(3)/mcL Final    Mono # 07/14/2023 2.14 (H)  0.1 - 1.3 x10(3)/mcL Final    Eos # 07/14/2023 0.13  0 - 0.9 x10(3)/mcL Final    Baso # 07/14/2023 0.04  <=0.2 x10(3)/mcL Final    IG# 07/14/2023 0.27 (H)  0 - 0.04 x10(3)/mcL Final    IG% 07/14/2023 1.2  % Final    NRBC% 07/14/2023 0.0  % Final    RBC Morph 07/14/2023 Normal  Normal Final    Platelets 07/14/2023 Adequate  Normal, Adequate Final       Imaging Results    None                                              Diagnostic Impression:    1. Extensor tenosynovitis of finger    2. Infection of hand due to bite, left, initial encounter    3. Cellulitis of left ring finger    4. Elevated C-reactive protein (CRP)         ED Disposition Condition    Admit Stable                       José Antonio Wiseman, DO  07/14/23 1141

## 2023-07-14 NOTE — PROGRESS NOTES
Pharmacokinetic Initial Assessment: IV Vancomycin    Assessment/Plan:    Initiate intravenous vancomycin with loading dose of 2000 mg once followed by a maintenance dose of vancomycin 1500mg IV every 12 hours  Desired empiric serum trough concentration is 10 to 20 mcg/mL  Draw vancomycin trough on 7/16/23 at 0700 before 4th dose.  Pharmacy will continue to follow and monitor vancomycin.         Patient brief summary:  Sonya Henderson is a 43 y.o. male initiated on antimicrobial therapy with IV Vancomycin for treatment of suspected skin & soft tissue infection    Drug Allergies:   Review of patient's allergies indicates:  No Known Allergies    Actual Body Weight:   100.7 kg    Renal Function:   Estimated Creatinine Clearance: 133.1 mL/min (based on SCr of 0.84 mg/dL).,     Dialysis Method (if applicable):  N/A    CBC (last 72 hours):  Recent Labs   Lab Result Units 07/14/23  1001   WBC x10(3)/mcL 22.05*   Hgb g/dL 15.2   Hct % 43.6   Platelet x10(3)/mcL 217   Mono % % 9.7   Eos % % 0.6   Basophil % % 0.2       Metabolic Panel (last 72 hours):  Recent Labs   Lab Result Units 07/14/23  1001   Sodium Level mmol/L 142   Potassium Level mmol/L 3.6   Chloride mmol/L 108*   Carbon Dioxide mmol/L 26   Glucose Level mg/dL 85   Blood Urea Nitrogen mg/dL 9.6   Creatinine mg/dL 0.84   Albumin Level g/dL 3.9   Bilirubin Total mg/dL 0.4   Alkaline Phosphatase unit/L 75   Aspartate Aminotransferase unit/L 19   Alanine Aminotransferase unit/L 39       Drug levels (last 3 results):  No results for input(s): VANCOMYCINRA, VANCORANDOM, VANCOMYCINPE, VANCOPEAK, VANCOMYCINTR, VANCOTROUGH in the last 72 hours.    Microbiologic Results:  Microbiology Results (last 7 days)       Procedure Component Value Units Date/Time    Fungal Culture [[id number]] Collected: 07/14/23 1547    Order Status: Sent Specimen: Wound from Finger, Left Hand Updated: 07/14/23 1613    Anaerobic Culture [[id number]] Collected: 07/14/23 1547    Order Status:  Sent Specimen: Wound from Finger, Left Hand Updated: 07/14/23 1613    Wound Culture [[id number]] Collected: 07/14/23 1547    Order Status: Sent Specimen: Wound from Finger, Left Hand Updated: 07/14/23 1613    Gram Stain [[id number]] Collected: 07/14/23 1547    Order Status: Sent Specimen: Wound from Finger, Left Hand Updated: 07/14/23 1613    Fungal Culture [[id number]] Collected: 07/14/23 1548    Order Status: Sent Specimen: Wound from Finger, Left Hand Updated: 07/14/23 1613    Anaerobic Culture [[id number]] Collected: 07/14/23 1548    Order Status: Sent Specimen: Wound from Finger, Left Hand Updated: 07/14/23 1613    Wound Culture [[id number]] Collected: 07/14/23 1548    Order Status: Sent Specimen: Wound from Finger, Left Hand Updated: 07/14/23 1613    Gram Stain [[id number]] Collected: 07/14/23 1548    Order Status: Sent Specimen: Wound from Finger, Left Hand Updated: 07/14/23 1613    Fungal Culture [[id number]] Collected: 07/14/23 1548    Order Status: Sent Specimen: Wound from Hand, Left Updated: 07/14/23 1613    Anaerobic Culture [[id number]] Collected: 07/14/23 1548    Order Status: Sent Specimen: Wound from Hand, Left Updated: 07/14/23 1613    Wound Culture [[id number]] Collected: 07/14/23 1548    Order Status: Sent Specimen: Wound from Hand, Left Updated: 07/14/23 1613    Gram Stain [[id number]] Collected: 07/14/23 1548    Order Status: Sent Specimen: Wound from Hand, Left Updated: 07/14/23 1613

## 2023-07-14 NOTE — OP NOTE
07/14/2023    PRIMARY SURGEON: Azael Hendricks MD    ASSISTANT:  Meryl Royal NP was imperative to the critical parts of the surgical case.  This included the surgical approach and dissection, retraction, placement of hardware and implants, and closure.    PREOPERATIVE DIAGNOSIS:  Left hand deep soft tissue infection  Left ring finger abscess/infection    POSTOPERATIVE DIAGNOSIS:  Same    PROCEDURES:  Incision/drainage/irrigation/debridement of left dorsal hand  Incision/drainage/irrigation/debridement of left ring finger abscess    ANESTHESIA:  General    BLOOD LOSS:  Less than 5 ml    DVT PROPHYLAXIS:  None indicated    OUTCOME:  Patient tolerated treatment/procedure well without complications and is now ready for discharge.    DISPOSITION: Home/SelfCare    FOLLOW UP: In clinic    INSTRUMENTATION:  * No implants in log *  None    PROCEDURE IN DETAIL:    This patient was brought to the room placed on the table and the left upper extremity was prepped and draped in a normal sterile fashion.  A time-out procedure was performed.    The extremity was exsanguinated and placed a joint 50 mmHg.  I initially started with the already open track along the dorsal aspect of the left ring finger.  After extending the track proximally distally I then took scissors and expose the subcutaneous tissue.  There was purulent material.  Cultures were taken of this material.  I then irrigated the area with normal saline and also curetted and debrided the infection soft tissues.  I then closed the finger with chromic gut suture.    I then turned my attention to the dorsum of the hand.  I made a 5 cm dorsal longitudinal incision over the ring finger metacarpal.  After excising through the subcutaneous tissue there was obvious subcutaneous pus.  I then debrided the area with curette and a rongeur and then thoroughly irrigated the wound with normal saline.  I placed vancomycin powder in the wound.  I then closed the wound with 3-0 nylon  suture.    Plan for this patient is to be admitted to the hospital and continue with IV antibiotics.

## 2023-07-14 NOTE — ED TRIAGE NOTES
Pt states getting bit by what he thinks is a spider. Pt went to [facility] for treatment and was sent here by [facility] to be seen by a specialist.  Pt states having pain 8/10. Per the Patient

## 2023-07-14 NOTE — ANESTHESIA PREPROCEDURE EVALUATION
07/14/2023  Sonya Henderson is a 43 y.o., male.      Sonya Henderson    Pre-op Diagnosis: Infection of hand due to bite, left, initial encounter [S61.452A, L08.9]    Procedure(s):  IRRIGATION AND DEBRIDEMENT, UPPER EXTREMITY     Review of patient's allergies indicates:  No Known Allergies    Current Outpatient Medications   Medication Instructions    diclofenac (VOLTAREN) 75 mg, Oral, 2 times daily PRN    HYDROcodone-acetaminophen (NORCO) 5-325 mg per tablet 1 tablet, Oral, Every 6 hours PRN    omeprazole (PRILOSEC OTC) 20 mg, Oral, Daily           Past Medical History:   Diagnosis Date    GERD (gastroesophageal reflux disease)      PSH - NEG  FH GA problems - NEG  NPO since 0700 liquids (coke)  NPO since MN solids    Recent Labs   Lab 07/14/23  1001   WBC 22.05*   RBC 4.94   HGB 15.2   HCT 43.6   MCV 88.3   MCH 30.8   MCHC 34.9   RDW 13.6      MPV 10.5*       Recent Labs   Lab 07/14/23  1001      K 3.6   CO2 26   BUN 9.6   CREATININE 0.84   CALCIUM 9.6   ALBUMIN 3.9   ALKPHOS 75   ALT 39   AST 19   BILITOT 0.4       Pre-op Assessment    I have reviewed the Patient Summary Reports.    I have reviewed the NPO Status.   I have reviewed the Medications.     Review of Systems  Anesthesia Hx:  No problems with previous Anesthesia  Denies Family Hx of Anesthesia complications.   Denies Personal Hx of Anesthesia complications.   Social:  Non-Smoker    Cardiovascular:   Exercise tolerance: good  Denies Angina.  Denies Orthopnea.  Denies PND.  Denies PACHECO.  Functional Capacity good / => 4 METS    Hepatic/GI:   GERD    Musculoskeletal:  Musculoskeletal Normal    Neurological:   Denies TIA. Denies CVA.    Psych:  Psychiatric Normal           Physical Exam  General: Well nourished, Alert and Oriented    Airway:  Mallampati: III   Mouth Opening: Normal  TM Distance: Normal  Tongue:  Normal  Neck ROM: Normal ROM    Dental:  Intact    Chest/Lungs:  Clear to auscultation    Heart:  Rate: Normal  Rhythm: Regular Rhythm  No pretibial edema  No carotid bruits      Anesthesia Plan  Type of Anesthesia, risks & benefits discussed:    Anesthesia Type: Gen Supraglottic Airway  Intra-op Monitoring Plan: Standard ASA Monitors  Post Op Pain Control Plan: multimodal analgesia  Induction:  IV  Airway Plan: Direct, Post-Induction  Informed Consent: Informed consent signed with the Patient and all parties understand the risks and agree with anesthesia plan.  All questions answered. Patient consented to blood products? Yes  ASA Score: 2  Day of Surgery Review of History & Physical: H&P Update referred to the surgeon/provider.    Ready For Surgery From Anesthesia Perspective.     .

## 2023-07-14 NOTE — ANESTHESIA PROCEDURE NOTES
Intubation    Date/Time: 7/14/2023 3:22 PM  Performed by: José Antonio Wilkins CRNA  Authorized by: Richard Ferreira MD     Intubation:     Induction:  Intravenous    Mask Ventilation:  Not attempted    Attempts:  1    Attempted By:  CRNA    Difficult Airway Encountered?: No      Complications:  None    Airway Device:  Supraglottic airway/LMA    Airway Device Size:  4.0    Style/Cuff Inflation:  Cuffed (inflated to minimal occlusive pressure)    Inflation Amount (mL):  20    Placement Verified By:  Capnometry    Complicating Factors:  None    Findings Post-Intubation:  BS equal bilateral and atraumatic/condition of teeth unchanged

## 2023-07-14 NOTE — TRANSFER OF CARE
"Anesthesia Transfer of Care Note    Patient: Sonya Henderson    Procedure(s) Performed: Procedure(s) (LRB):  IRRIGATION AND DEBRIDEMENT, UPPER EXTREMITY (Left)    Patient location: PACU    Anesthesia Type: general    Transport from OR: Transported from OR on room air with adequate spontaneous ventilation    Post pain: adequate analgesia    Post assessment: no apparent anesthetic complications    Post vital signs: stable    Level of consciousness: sedated    Nausea/Vomiting: no nausea/vomiting    Complications: none    Transfer of care protocol was followed      Last vitals:   Visit Vitals  /82   Pulse 78   Temp 36.8 °C (98.2 °F)   Resp 20   Ht 5' 8" (1.727 m)   Wt 100.7 kg (222 lb)   SpO2 99%   BMI 33.75 kg/m²     "

## 2023-07-14 NOTE — H&P
[facility]    [facility] Medicine History & Physical Examination       Patient Name: Sonya Henderson  MRN: [id number]  Patient Class: IP- Inpatient   Admission Date: 7/14/2023   Admitting Physician: [place] Service   Length of Stay: 0  Attending Physician: Fermín Salcedo MD  Primary Care Provider: Primary Doctor No  Face-to-Face encounter date: 07/14/2023    Code Status: Full Code    Chief Complaint: Insect Bite (Pt states getting bit by what he thinks is a spider. Pt went to [facility] for treatment and was sent here by [facility] to be seen by a specialist. Per the Patient)          HISTORY OF PRESENT ILLNESS:   Sonya Henderson is a 43 y.o. male who  has a past medical history of GERD (gastroesophageal reflux disease).. The patient presented to [facility] on 7/14/2023 with a primary complaint of pain and swelling of the 4th finger on his left hand. It started 3 days ago. He works as a , he isn't sure it he had an insect bite. He reports prior skin abscesses on 2 other occasions also on his hands. He went to the OR for I&D this afternoon. He has no other c/o.    PAST MEDICAL HISTORY:     Past Medical History:   Diagnosis Date    GERD (gastroesophageal reflux disease)        PAST SURGICAL HISTORY:   History reviewed. No pertinent surgical history.    ALLERGIES:   Patient has no known allergies.    FAMILY HISTORY:   family history is not on file.  Denies  SOCIAL HISTORY:     Social History     Tobacco Use    Smoking status: Never    Smokeless tobacco: Never   Substance Use Topics    Alcohol use: Never        HOME MEDICATIONS:     Prior to Admission medications    Medication Sig Start Date End Date Taking? Authorizing Provider   diclofenac (VOLTAREN) 75 MG EC tablet Take 1 tablet (75 mg total) by mouth 2 (two) times daily as needed (pain). 7/30/22   Peter Alejo, ACNNICOLA   HYDROcodone-acetaminophen (NORCO) 5-325 mg per tablet Take 1 tablet by mouth every 6 (six) hours as needed  for Pain. 7/23/22   Selina Murdock MD   omeprazole (PRILOSEC OTC) 20 MG tablet Take 20 mg by mouth once daily.    Historical Provider       REVIEW OF SYSTEMS:   Except as documented, all other systems reviewed and negative   Review of Systems   Constitutional:  Negative for chills and fever.   Cardiovascular:  Negative for chest pain and palpitations.   Gastrointestinal:  Negative for abdominal pain, nausea and vomiting.   Neurological:  Negative for focal weakness and weakness.   All other systems reviewed and are negative.      PHYSICAL EXAM:     VITAL SIGNS: 24 HRS MIN & MAX LAST   Temp  Min: 98.1 °F (36.7 °C)  Max: 98.5 °F (36.9 °C) 98.5 °F (36.9 °C)   BP  Min: 118/96  Max: 155/96 (!) 152/85   Pulse  Min: 78  Max: 113  94   Resp  Min: 18  Max: 20 18   SpO2  Min: 92 %  Max: 100 % 95 %       General appearance: Well-developed, well-nourished male in no apparent distress.  HENT: Atraumatic head. Moist mucous membranes of oral cavity.  Eyes: Normal extraocular movements.   Neck: Supple.   Lungs: Clear to auscultation bilaterally. No wheezing present.   Heart: Regular rate and rhythm. S1 and S2 present with no murmurs/gallop/rub. No pedal edema. No JVD present.   Abdomen: Soft, non-distended, non-tender. No rebound tenderness/guarding. Bowel sounds are normal.   Extremities: No cyanosis, clubbing, or edema. Left hand bandaged, arm in sling  Skin: abscess to left 4th finger and cellulitis to left hand -see ER pic  Neuro: Motor and sensory exams grossly intact. Good tone.   Psych/mental status: Appropriate mood and affect. Responds appropriately to questions.     LABS AND IMAGING:     Recent Labs   Lab 07/14/23  1001   WBC 22.05*   RBC 4.94   HGB 15.2   HCT 43.6   MCV 88.3   MCH 30.8   MCHC 34.9   RDW 13.6      MPV 10.5*       Recent Labs   Lab 07/14/23  1001      K 3.6   CO2 26   BUN 9.6   CREATININE 0.84   CALCIUM 9.6   ALBUMIN 3.9   ALKPHOS 75   ALT 39   AST 19   BILITOT 0.4     Recent Results (from the  past 24 hour(s))   Comprehensive metabolic panel    Collection Time: 07/14/23 10:01 AM   Result Value Ref Range    Sodium Level 142 136 - 145 mmol/L    Potassium Level 3.6 3.5 - 5.1 mmol/L    Chloride 108 (H) 98 - 107 mmol/L    Carbon Dioxide 26 22 - 29 mmol/L    Glucose Level 85 74 - 100 mg/dL    Blood Urea Nitrogen 9.6 8.9 - 20.6 mg/dL    Creatinine 0.84 0.73 - 1.18 mg/dL    Calcium Level Total 9.6 8.4 - 10.2 mg/dL    Protein Total 6.9 6.4 - 8.3 gm/dL    Albumin Level 3.9 3.5 - 5.0 g/dL    Globulin 3.0 2.4 - 3.5 gm/dL    Albumin/Globulin Ratio 1.3 1.1 - 2.0 ratio    Bilirubin Total 0.4 <=1.5 mg/dL    Alkaline Phosphatase 75 40 - 150 unit/L    Alanine Aminotransferase 39 0 - 55 unit/L    Aspartate Aminotransferase 19 5 - 34 unit/L    eGFR >60 mls/min/1.73/m2   Sedimentation Rate    Collection Time: 07/14/23 10:01 AM   Result Value Ref Range    Sed Rate 24 (H) 0 - 15 mm/hr   C-reactive protein    Collection Time: 07/14/23 10:01 AM   Result Value Ref Range    C-Reactive Protein 115.60 (H) <5.00 mg/L   CBC with Differential    Collection Time: 07/14/23 10:01 AM   Result Value Ref Range    WBC 22.05 (H) 4.50 - 11.50 x10(3)/mcL    RBC 4.94 4.70 - 6.10 x10(6)/mcL    Hgb 15.2 14.0 - 18.0 g/dL    Hct 43.6 42.0 - 52.0 %    MCV 88.3 80.0 - 94.0 fL    MCH 30.8 27.0 - 31.0 pg    MCHC 34.9 33.0 - 36.0 g/dL    RDW 13.6 11.5 - 17.0 %    Platelet 217 130 - 400 x10(3)/mcL    MPV 10.5 (H) 7.4 - 10.4 fL    Neut % 80.1 %    Lymph % 8.2 %    Mono % 9.7 %    Eos % 0.6 %    Basophil % 0.2 %    Lymph # 1.81 0.6 - 4.6 x10(3)/mcL    Neut # 17.66 (H) 2.1 - 9.2 x10(3)/mcL    Mono # 2.14 (H) 0.1 - 1.3 x10(3)/mcL    Eos # 0.13 0 - 0.9 x10(3)/mcL    Baso # 0.04 <=0.2 x10(3)/mcL    IG# 0.27 (H) 0 - 0.04 x10(3)/mcL    IG% 1.2 %    NRBC% 0.0 %   Blood Smear Microscopic Exam    Collection Time: 07/14/23 10:01 AM   Result Value Ref Range    RBC Morph Normal Normal    Platelets Adequate Normal, Adequate       Microbiology Results (last 7 days)        Procedure Component Value Units Date/Time    Fungal Culture [[id number]] Collected: 07/14/23 1547    Order Status: Sent Specimen: Wound from Finger, Left Hand Updated: 07/14/23 1613    Anaerobic Culture [[id number]] Collected: 07/14/23 1547    Order Status: Sent Specimen: Wound from Finger, Left Hand Updated: 07/14/23 1613    Wound Culture [[id number]] Collected: 07/14/23 1547    Order Status: Sent Specimen: Wound from Finger, Left Hand Updated: 07/14/23 1613    Gram Stain [[id number]] Collected: 07/14/23 1547    Order Status: Sent Specimen: Wound from Finger, Left Hand Updated: 07/14/23 1613    Fungal Culture [[id number]] Collected: 07/14/23 1548    Order Status: Sent Specimen: Wound from Finger, Left Hand Updated: 07/14/23 1613    Anaerobic Culture [[id number]] Collected: 07/14/23 1548    Order Status: Sent Specimen: Wound from Finger, Left Hand Updated: 07/14/23 1613    Wound Culture [[id number]] Collected: 07/14/23 1548    Order Status: Sent Specimen: Wound from Finger, Left Hand Updated: 07/14/23 1613    Gram Stain [[id number]] Collected: 07/14/23 1548    Order Status: Sent Specimen: Wound from Finger, Left Hand Updated: 07/14/23 1613    Fungal Culture [[id number]] Collected: 07/14/23 1548    Order Status: Sent Specimen: Wound from Hand, Left Updated: 07/14/23 1613    Anaerobic Culture [[id number]] Collected: 07/14/23 1548    Order Status: Sent Specimen: Wound from Hand, Left Updated: 07/14/23 1613    Wound Culture [[id number]] Collected: 07/14/23 1548    Order Status: Sent Specimen: Wound from Hand, Left Updated: 07/14/23 1613    Gram Stain [[id number]] Collected: 07/14/23 1548    Order Status: Sent Specimen: Wound from Hand, Left Updated: 07/14/23 1613             X-Ray Hand 3 view Left  Narrative: EXAMINATION:  XR HAND COMPLETE 3 VIEW LEFT    CLINICAL HISTORY:  pain and swelling, suspect insect bite; worse in 4th finger;    TECHNIQUE:  Radiographs of the left hand with AP, lateral and oblique  views.    COMPARISON:  No  prior imaging available for comparison    FINDINGS:  There is no acute fracture, subluxation or dislocation.    Joints and interspaces appear maintained.    Osseous structures show normal bone mineral density.    Diffuse soft tissue edema.    There are no radiopaque foreign bodies.  Impression: Diffuse soft tissue edema is noted with no acute osseous abnormality.    Electronically signed by: Cristopher Pop  Date:    07/14/2023  Time:    11:46        __________________________________________________________________________  INPATIENT LIST OF MEDICATIONS     Scheduled Meds:   piperacillin-tazobactam (Zosyn) IV (PEDS and ADULTS) (extended infusion is not appropriate)  4.5 g Intravenous Q8H    vancomycin (VANCOCIN) IV (PEDS and ADULTS)  1,000 mg Intravenous Once    And    vancomycin (VANCOCIN) IV (PEDS and ADULTS)  1,000 mg Intravenous Once    vancomycin        [START ON 7/15/2023] vancomycin (VANCOCIN) IV (PEDS and ADULTS)  1,500 mg Intravenous Q12H     Continuous Infusions:   lactated ringers       PRN Meds:acetaminophen, cloNIDine, HYDROcodone-acetaminophen, HYDROmorphone, ketorolac, melatonin, ondansetron, sodium chloride 0.9%, sodium chloride 0.9%, Pharmacy to dose Vancomycin consult **AND** vancomycin - pharmacy to dose          ASSESSMENT & PLAN:     Left hand abscess  Elevated blood pressure    Plan  Cont zosyn and vancomycin  Add prn clonidine for elevated pressure  Check am labs        Fermín Salcedo MD   07/14/2023

## 2023-07-14 NOTE — NURSING
Patient requested that his wife be the . She agreed to translate instructions and stay at bedside during this stay.

## 2023-07-14 NOTE — ANESTHESIA POSTPROCEDURE EVALUATION
Anesthesia Post Evaluation    Patient: Sonya Henderson    Procedure(s) Performed: Procedure(s) (LRB):  IRRIGATION AND DEBRIDEMENT, UPPER EXTREMITY (Left)    Final Anesthesia Type: general      Patient location during evaluation: PACU  Patient participation: Yes- Able to Participate  Level of consciousness: awake and alert and oriented  Post-procedure vital signs: reviewed and stable  Pain management: adequate  Airway patency: patent    PONV status at discharge: No PONV  Anesthetic complications: no      Cardiovascular status: hemodynamically stable  Respiratory status: unassisted  Hydration status: euvolemic  Follow-up not needed.          Vitals Value Taken Time   /96 07/14/23 1626   Temp 36.8 07/14/23 1626   Pulse 99 07/14/23 1626   Resp 16 07/14/23 1626   SpO2 96 % 07/14/23 1626   Vitals shown include unvalidated device data.      No case tracking events are documented in the log.      Pain/Melony Score: Pain Rating Prior to Med Admin: 4 (7/14/2023  4:22 PM)  Pain Rating Post Med Admin: 8 (7/14/2023 10:03 AM)  Melony Score: 9 (7/14/2023  4:25 PM)

## 2023-07-14 NOTE — NURSING
Nurses Note -- 4 Eyes      7/14/2023   5:30 PM      Skin assessed during: Admit      [x] No Altered Skin Integrity Present    [x]Prevention Measures Documented      [] Yes- Altered Skin Integrity Present or Discovered   [] LDA Added if Not in Epic (Describe Wound)   [] New Altered Skin Integrity was Present on Admit and Documented in LDA   [] Wound Image Taken    Wound Care Consulted? No    Attending Nurse:  KAMERON Cedeno RN/Staff Member:  Betina Gonzalez RN

## 2023-07-14 NOTE — ED PROVIDER NOTES
Encounter Date: 7/14/2023       History     Chief Complaint   Patient presents with    Hand Pain     Left hand swelling that started 3 days ago. Patient believes to be spider bite. Pain rated 8/10     Patient reports pain and swelling of the 4th finger and hand for the past x3 days after a possible insect/spider bite occurred while working on a car (pt is a ); pt reports his tetanus is UTD    The history is provided by the patient.   Hand Pain  This is a new problem. The current episode started more than 2 days ago. The problem has been rapidly worsening. Pertinent negatives include no chest pain, no abdominal pain and no shortness of breath.   Review of patient's allergies indicates:  No Known Allergies  Past Medical History:   Diagnosis Date    GERD (gastroesophageal reflux disease)      No past surgical history on file.  No family history on file.  Social History     Tobacco Use    Smoking status: Never    Smokeless tobacco: Never   Substance Use Topics    Alcohol use: Never    Drug use: Not Currently     Review of Systems   Constitutional:  Negative for fever.   HENT:  Negative for sore throat.    Respiratory:  Negative for shortness of breath.    Cardiovascular:  Negative for chest pain.   Gastrointestinal:  Negative for abdominal pain and nausea.   Genitourinary:  Negative for dysuria.   Musculoskeletal:  Negative for back pain.   Skin:  Negative for rash.   Neurological:  Negative for weakness.   Hematological:  Does not bruise/bleed easily.   Psychiatric/Behavioral: Negative.       Physical Exam     Initial Vitals [07/14/23 0919]   BP Pulse Resp Temp SpO2   (!) 143/80 92 18 98.1 °F (36.7 °C) 97 %      MAP       --         Physical Exam    Vitals reviewed.  Constitutional: He appears well-developed and well-nourished.   HENT:   Head: Normocephalic and atraumatic.   Eyes: Conjunctivae and EOM are normal. Pupils are equal, round, and reactive to light.   Neck:   Normal range of motion.  Cardiovascular:   Normal rate, regular rhythm, normal heart sounds and intact distal pulses.           Pulmonary/Chest: Breath sounds normal. He exhibits no tenderness.   Abdominal: Abdomen is soft. Bowel sounds are normal. He exhibits no distension. There is no abdominal tenderness.   Musculoskeletal:      Right hand: Normal.      Left hand: Swelling and tenderness present. Decreased range of motion. Normal pulse.        Hands:       Cervical back: Normal range of motion.      Comments: 4th finger consistent with reported bite; no fluctuance to suggest drainable abscess     Neurological: He is alert and oriented to person, place, and time. He displays normal reflexes. No cranial nerve deficit or sensory deficit. GCS score is 15. GCS eye subscore is 4. GCS verbal subscore is 5. GCS motor subscore is 6.   Skin: Skin is warm. No pallor.   Psychiatric: He has a normal mood and affect. His behavior is normal. Judgment and thought content normal.           ED Course   Procedures  Labs Reviewed   COMPREHENSIVE METABOLIC PANEL - Abnormal; Notable for the following components:       Result Value    Chloride 108 (*)     All other components within normal limits   SEDIMENTATION RATE, AUTOMATED - Abnormal; Notable for the following components:    Sed Rate 24 (*)     All other components within normal limits   C-REACTIVE PROTEIN - Abnormal; Notable for the following components:    C-Reactive Protein 115.60 (*)     All other components within normal limits   CBC WITH DIFFERENTIAL - Abnormal; Notable for the following components:    WBC 22.05 (*)     MPV 10.5 (*)     Neut # 17.66 (*)     Mono # 2.14 (*)     IG# 0.27 (*)     All other components within normal limits   BLOOD SMEAR MICROSCOPIC EXAM ([facility]) - Normal   BLOOD CULTURE [facility]   BLOOD CULTURE [facility]   CBC W/ AUTO DIFFERENTIAL    Narrative:     The following orders were created for panel order CBC auto differential.  Procedure                               Abnormality         Status                      ---------                               -----------         ------                     CBC with Differential[[id number]]        Abnormal            Final result                 Please view results for these tests on the individual orders.   EXTRA TUBES    Narrative:     The following orders were created for panel order EXTRA TUBES.  Procedure                               Abnormality         Status                     ---------                               -----------         ------                     Light Blue Top Hold[[id number]]                              In process                 Gold Top Hold[[id number]]                                    In process                 Pink Top Hold[[id number]]                                    In process                   Please view results for these tests on the individual orders.   LIGHT BLUE TOP HOLD   GOLD TOP HOLD   PINK TOP HOLD          Imaging Results              X-Ray Hand 3 view Left (Final result)  Result time 07/14/23 11:46:18      Final result by Cristopher Pop MD (07/14/23 11:46:18)                   Impression:      Diffuse soft tissue edema is noted with no acute osseous abnormality.      Electronically signed by: Cristopher Pop  Date:    07/14/2023  Time:    11:46               Narrative:    EXAMINATION:  XR HAND COMPLETE 3 VIEW LEFT    CLINICAL HISTORY:  pain and swelling, suspect insect bite; worse in 4th finger;    TECHNIQUE:  Radiographs of the left hand with AP, lateral and oblique  views.    COMPARISON:  No prior imaging available for comparison    FINDINGS:  There is no acute fracture, subluxation or dislocation.    Joints and interspaces appear maintained.    Osseous structures show normal bone mineral density.    Diffuse soft tissue edema.    There are no radiopaque foreign bodies.                                       Medications   ketorolac injection 30 mg (30 mg Intramuscular Given 7/14/23 0955)   HYDROcodone-acetaminophen 5-325 mg per  "tablet 1 tablet (1 tablet Oral Given 7/14/23 4055)   clindamycin in D5W 600 mg/50 mL IVPB 600 mg (0 mg Intravenous Stopped 7/14/23 8403)     Medical Decision Making:   ED Management:  Boostrix (Tdap) intramuscular suspension: 0.5 mL, form: Injection, IM, Once-Unscheduled, Order duration: 1 dose(s), first dose 04/08/20 16:28:00 CDT  Other:   I have discussed this case with another health care provider.       <> Summary of the Discussion:   Discussed with transfer center; accepted at [facility] by Dr Bardales (ER) after discussing with Dr Addison (ortho) thru transfer center; patient is being transferred for capacity issues at this facility (no hand specialist or inpatient ortho)    Patient will go to hospital in private vehicle - pt is stable and comfortable with having his wife drive; pt is declining Ambulance transport; pt understands he is at risk for MVA in his private vehicle          Attending Attestation:     Physician Attestation Statement for NP/PA:   I have directed and reviewed the workup performed by the PA/NP.  I performed the substantive portion of the medical decision making.     Other NP/PA Attestation Additions:    History of Present Illness: Presents with left hand swelling with associated erythema after a suspected insect bite. Pt denies injury but works as a . He is right handed. States he press on the finger and a black "thorn or stick" came out. Denies medical problems   Physical Exam: Extensive swelling with associated erythema among dorsal aspect of the left hand involving the ring finger with an open wound among proximal ring finger phalanx. Limited AROM due to pain and swelling. CRF 3-4 sec   Medical Decision Making: Pt with significant leukocytosis and CRP for which IV antibiotic therapy started. Due to suspected tenosynovitis patient may require surgical intervention for which will transfer for hand surgery evaluation and management                   .: Patient refused " recommended mode of transport, explained increased risk. (patient is going using private vehicle)    Clinical Impression:   Final diagnoses:  [L03.119] Cellulitis of hand (Primary)  [M65.9] Tenosynovitis        ED Disposition Condition    Transfer to Another Facility Stable                Nayan Brandt MD  07/14/23 1234       ROSALBA Pinzon  07/14/23 1252       ROSALBA Pinzon  07/14/23 1254       Nayan Brandt MD  07/14/23 1329

## 2023-07-14 NOTE — CONSULTS
[facility] Orthopaedics - Emergency Dept  Orthopedic Consultation Note:    Patient Name: Sonya Henderson  MRN: [id number]  Admission Date: 7/14/2023  Hospital Length of Stay: 0 days  Attending Provider: Fermín Salcedo MD  Primary Care Provider: Primary Doctor No    Consults  Subjective:     Chief Complaint:   Chief Complaint   Patient presents with    Insect Bite     Pt states getting bit by what he thinks is a spider. Pt went to [facility] for treatment and was sent here by [facility] to be seen by a specialist. Per the Patient        History of Present Illness:     This is a 43-year-old male who presented to the ED for evaluation of his left hand.  He reports that he thinks he sustained a spider bite approximately 3 days ago.  Since that time he has noticed increasing pain, swelling, and erythema to the left ring finger and hand.  He was administered clindamycin 600 mg in the ED and tetanus is reportedly up-to-date.  Orthopedic surgery evaluated for possible irrigation and debridement.    Past Medical History:   Diagnosis Date    GERD (gastroesophageal reflux disease)        History reviewed. No pertinent surgical history.    Review of patient's allergies indicates:  No Known Allergies    No current facility-administered medications for this encounter.     Current Outpatient Medications   Medication Sig    diclofenac (VOLTAREN) 75 MG EC tablet Take 1 tablet (75 mg total) by mouth 2 (two) times daily as needed (pain).    HYDROcodone-acetaminophen (NORCO) 5-325 mg per tablet Take 1 tablet by mouth every 6 (six) hours as needed for Pain.    omeprazole (PRILOSEC OTC) 20 MG tablet Take 20 mg by mouth once daily.     Family History    None       Tobacco Use    Smoking status: Never    Smokeless tobacco: Never   Substance and Sexual Activity    Alcohol use: Never    Drug use: Not Currently    Sexual activity: Yes     Partners: Female       Review of Systems:  Constitutional: Denies fever chills  CV: No chest pain  Resp:  "No labored breathing  MSK: Pain evident at site of injury located in HPI   Integ: No signs of abrasions or lacerations  Neuro: No numbness or tingling  Lymphatic: No swelling outside the area of injury     Objective:     Vital Signs (Most Recent):  Temp: 98.2 °F (36.8 °C) (07/14/23 1415)  Pulse: 78 (07/14/23 1415)  Resp: 20 (07/14/23 1415)  BP: (!) 142/77 (07/14/23 1415)  SpO2: 99 % (07/14/23 1415) Vital Signs (24h Range):  Temp:  [98.1 °F (36.7 °C)-98.2 °F (36.8 °C)] 98.2 °F (36.8 °C)  Pulse:  [78-92] 78  Resp:  [18-20] 20  SpO2:  [97 %-100 %] 99 %  BP: (140-146)/(77-85) 142/77     Weight: 100.7 kg (222 lb)  Height: 5' 8" (172.7 cm)  Body mass index is 33.75 kg/m².    No intake or output data in the 24 hours ending 07/14/23 1512    Physical Examination:  General: Alert and oriented x3 no acute distress nontoxic-appearing appropriate affect.  Constitutional: Vital signs are examined and stable.  Resp: No signs of labored breathing  Musculoskeletal:  Left Hand Exam:  Notable swelling, tenderness, and erythema over the dorsum of the left hand.  There appears to be abscess formation at the base of the ring finger. Range of motion in thumb, index, and middle finger within functional limits.  Extensor deficits to ring and little finger.  2+ radial pulse and CR less than 3 seconds. Sensibility normal.      Significant Labs: All pertinent labs within the past 24 hours have been reviewed.  Recent Lab Results         07/14/23  1001        Albumin/Globulin Ratio 1.3       Albumin 3.9       Alkaline Phosphatase 75       ALT 39       AST 19       Baso # 0.04       Basophil % 0.2       BILIRUBIN TOTAL 0.4       BUN 9.6       Calcium 9.6       Chloride 108       CO2 26       Creatinine 0.84       .60       eGFR >60       Eos # 0.13       Eosinophil % 0.6       Globulin, Total 3.0       Glucose 85       Hematocrit 43.6       Hemoglobin 15.2       Immature Grans (Abs) 0.27       Immature Granulocytes 1.2       Lymph # 1.81   "     LYMPH % 8.2       MCH 30.8       MCHC 34.9       MCV 88.3       Mono # 2.14       Mono % 9.7       MPV 10.5       Neut # 17.66       Neut % 80.1       nRBC 0.0       Platelet Estimate Adequate       Platelets 217       Potassium 3.6       PROTEIN TOTAL 6.9       RBC 4.94       RBC Morph Normal       RDW 13.6       Sed Rate 24       Sodium 142       WBC 22.05                Significant Imaging: I have reviewed all pertinent imaging results/findings.  X-Ray Hand 3 view Left    Result Date: 7/14/2023  EXAMINATION: XR HAND COMPLETE 3 VIEW LEFT CLINICAL HISTORY: pain and swelling, suspect insect bite; worse in 4th finger; TECHNIQUE: Radiographs of the left hand with AP, lateral and oblique  views. COMPARISON: No prior imaging available for comparison FINDINGS: There is no acute fracture, subluxation or dislocation. Joints and interspaces appear maintained. Osseous structures show normal bone mineral density. Diffuse soft tissue edema. There are no radiopaque foreign bodies.     Diffuse soft tissue edema is noted with no acute osseous abnormality. Electronically signed by: Cristopher Pop Date:    07/14/2023 Time:    11:46       Assessment/Plan:     There are no hospital problems to display for this patient.    Recommend surgical intervention via irrigation and debridement of the left hand and ring finger abscess formation with intraoperative cultures.  We had an extensive discussion about the surgical procedure, the perioperative recovery, and postoperative recovery.  The proposed procedure as well as associated risks/benefits were discussed at length with the patient and family with risks to include pain, bleeding, infection, future surgeries, neurovascular compromise, loss of limb, heart attack, stroke, deep vein thrombosis, and even death.  Patient understands risks, benefits, and alternatives.  Patient signed an informed consent.  Patient will be placed on antibiotics postoperatively.        Thank you for your  consult. I will follow-up with patient. Please contact us if you have any additional questions.        This note was created with the assistance of voice recognition software or phone dictation.  There may be transcription errors as a result of using this technology however minimal. Effort has been made to assure accuracy of transcription but any obvious errors or omissions should be clarified with the author of the document.

## 2023-07-15 LAB
ALBUMIN SERPL-MCNC: 3.4 G/DL (ref 3.5–5)
ALBUMIN/GLOB SERPL: 1.1 RATIO (ref 1.1–2)
ALP SERPL-CCNC: 69 UNIT/L (ref 40–150)
ALT SERPL-CCNC: 29 UNIT/L (ref 0–55)
AST SERPL-CCNC: 17 UNIT/L (ref 5–34)
BASOPHILS # BLD AUTO: 0.05 X10(3)/MCL
BASOPHILS NFR BLD AUTO: 0.3 %
BILIRUBIN DIRECT+TOT PNL SERPL-MCNC: 0.7 MG/DL
BUN SERPL-MCNC: 11.1 MG/DL (ref 8.9–20.6)
CALCIUM SERPL-MCNC: 9.4 MG/DL (ref 8.4–10.2)
CHLORIDE SERPL-SCNC: 106 MMOL/L (ref 98–107)
CO2 SERPL-SCNC: 24 MMOL/L (ref 22–29)
CREAT SERPL-MCNC: 0.95 MG/DL (ref 0.73–1.18)
EOSINOPHIL # BLD AUTO: 0.17 X10(3)/MCL (ref 0–0.9)
EOSINOPHIL NFR BLD AUTO: 0.9 %
ERYTHROCYTE [DISTWIDTH] IN BLOOD BY AUTOMATED COUNT: 13.8 % (ref 11.5–17)
GFR SERPLBLD CREATININE-BSD FMLA CKD-EPI: >60 MLS/MIN/1.73/M2
GLOBULIN SER-MCNC: 3.1 GM/DL (ref 2.4–3.5)
GLUCOSE SERPL-MCNC: 105 MG/DL (ref 74–100)
GRAM STN SPEC: NORMAL
HCT VFR BLD AUTO: 42.3 % (ref 42–52)
HGB BLD-MCNC: 14.3 G/DL (ref 14–18)
IMM GRANULOCYTES # BLD AUTO: 0.15 X10(3)/MCL (ref 0–0.04)
IMM GRANULOCYTES NFR BLD AUTO: 0.8 %
LYMPHOCYTES # BLD AUTO: 2.06 X10(3)/MCL (ref 0.6–4.6)
LYMPHOCYTES NFR BLD AUTO: 10.6 %
MCH RBC QN AUTO: 30.1 PG (ref 27–31)
MCHC RBC AUTO-ENTMCNC: 33.8 G/DL (ref 33–36)
MCV RBC AUTO: 89.1 FL (ref 80–94)
MONOCYTES # BLD AUTO: 1.88 X10(3)/MCL (ref 0.1–1.3)
MONOCYTES NFR BLD AUTO: 9.6 %
NEUTROPHILS # BLD AUTO: 15.2 X10(3)/MCL (ref 2.1–9.2)
NEUTROPHILS NFR BLD AUTO: 77.8 %
NRBC BLD AUTO-RTO: 0 %
PLATELET # BLD AUTO: 225 X10(3)/MCL (ref 130–400)
PMV BLD AUTO: 10.1 FL (ref 7.4–10.4)
POTASSIUM SERPL-SCNC: 3.9 MMOL/L (ref 3.5–5.1)
PROT SERPL-MCNC: 6.5 GM/DL (ref 6.4–8.3)
RBC # BLD AUTO: 4.75 X10(6)/MCL (ref 4.7–6.1)
SODIUM SERPL-SCNC: 140 MMOL/L (ref 136–145)
WBC # SPEC AUTO: 19.51 X10(3)/MCL (ref 4.5–11.5)

## 2023-07-15 PROCEDURE — 63600175 PHARM REV CODE 636 W HCPCS: Performed by: INTERNAL MEDICINE

## 2023-07-15 PROCEDURE — 94761 N-INVAS EAR/PLS OXIMETRY MLT: CPT

## 2023-07-15 PROCEDURE — 80053 COMPREHEN METABOLIC PANEL: CPT | Performed by: INTERNAL MEDICINE

## 2023-07-15 PROCEDURE — 25000003 PHARM REV CODE 250: Performed by: INTERNAL MEDICINE

## 2023-07-15 PROCEDURE — 85025 COMPLETE CBC W/AUTO DIFF WBC: CPT | Performed by: INTERNAL MEDICINE

## 2023-07-15 PROCEDURE — 11000001 HC ACUTE MED/SURG PRIVATE ROOM

## 2023-07-15 PROCEDURE — 25000003 PHARM REV CODE 250: Performed by: ORTHOPAEDIC SURGERY

## 2023-07-15 RX ORDER — HYDROCODONE BITARTRATE AND ACETAMINOPHEN 5; 325 MG/1; MG/1
1 TABLET ORAL EVERY 4 HOURS PRN
Status: DISCONTINUED | OUTPATIENT
Start: 2023-07-15 | End: 2023-07-17 | Stop reason: HOSPADM

## 2023-07-15 RX ADMIN — HYDROCODONE BITARTRATE AND ACETAMINOPHEN 1 TABLET: 5; 325 TABLET ORAL at 12:07

## 2023-07-15 RX ADMIN — HYDROCODONE BITARTRATE AND ACETAMINOPHEN 1 TABLET: 5; 325 TABLET ORAL at 08:07

## 2023-07-15 RX ADMIN — PIPERACILLIN SODIUM AND TAZOBACTAM SODIUM 4.5 G: 4; .5 INJECTION, POWDER, LYOPHILIZED, FOR SOLUTION INTRAVENOUS at 04:07

## 2023-07-15 RX ADMIN — HYDROMORPHONE HYDROCHLORIDE 1 MG: 2 INJECTION INTRAMUSCULAR; INTRAVENOUS; SUBCUTANEOUS at 12:07

## 2023-07-15 RX ADMIN — HYDROCODONE BITARTRATE AND ACETAMINOPHEN 1 TABLET: 5; 325 TABLET ORAL at 04:07

## 2023-07-15 RX ADMIN — HYDROMORPHONE HYDROCHLORIDE 1 MG: 2 INJECTION INTRAMUSCULAR; INTRAVENOUS; SUBCUTANEOUS at 05:07

## 2023-07-15 RX ADMIN — VANCOMYCIN HYDROCHLORIDE 1500 MG: 1.5 INJECTION, POWDER, LYOPHILIZED, FOR SOLUTION INTRAVENOUS at 08:07

## 2023-07-15 RX ADMIN — PIPERACILLIN SODIUM AND TAZOBACTAM SODIUM 4.5 G: 4; .5 INJECTION, POWDER, LYOPHILIZED, FOR SOLUTION INTRAVENOUS at 10:07

## 2023-07-15 RX ADMIN — PIPERACILLIN SODIUM AND TAZOBACTAM SODIUM 4.5 G: 4; .5 INJECTION, POWDER, LYOPHILIZED, FOR SOLUTION INTRAVENOUS at 01:07

## 2023-07-15 NOTE — PROGRESS NOTES
[facility]TH[facility]AED[facility]  [facility] Medicine Progress Note      Patient Name: Sonya Henderson  MRN: [id number]  Admission Date: 7/14/2023   Length of Stay: 1  Attending Physician: Fermín Salcedo MD  Primary Care Provider: Primary Doctor No  Face-to-Face encounter date: 07/15/2023    Code Status: Full Code        Chief Complaint:   Insect Bite (Pt states getting bit by what he thinks is a spider. Pt went to [facility] for treatment and was sent here by [facility] to be seen by a specialist. Per the Patient)        HPI:   Sonya Henderson is a 43 y.o. male who  has a past medical history of GERD (gastroesophageal reflux disease).. The patient presented to [facility] on 7/14/2023 with a primary complaint of pain and swelling of the 4th finger on his left hand. It started 3 days ago. He works as a , he isn't sure it he had an insect bite. He reports prior skin abscesses on 2 other occasions also on his hands.     Overview/Hospital Course:  07/14/2023     PRIMARY SURGEON: Azael Hendricks MD  PREOPERATIVE DIAGNOSIS:  Left hand deep soft tissue infection  Left ring finger abscess/infection     POSTOPERATIVE DIAGNOSIS:  Same     PROCEDURES:  Incision/drainage/irrigation/debridement of left dorsal hand  Incision/drainage/irrigation/debridement of left ring finger abscess       Interval Hx:   The pt has no c/o, no fever or chills, pain controlled. His wife help translate.    Review of Systems   All other systems reviewed and are negative.    Objective/physical exam:  General: In no acute distress, afebrile  Chest: Clear to auscultation bilaterally  Heart: RRR, +S1, S2, no appreciable murmur  Abdomen: Soft, nontender, BS +  MSK: left hand bandged  Neurologic: Alert and oriented x4    VITAL SIGNS: 24 HRS MIN & MAX LAST   Temp  Min: 97.8 °F (36.6 °C)  Max: 99.3 °F (37.4 °C) 97.8 °F (36.6 °C)   BP  Min: 107/59  Max: 155/96 125/72   Pulse  Min: 90  Max: 113  90   Resp  Min: 16  Max: 19 16    SpO2  Min: 91 %  Max: 98 % 97 %       Recent Labs   Lab 07/14/23  1001 07/15/23  0536   WBC 22.05* 19.51*   RBC 4.94 4.75   HGB 15.2 14.3   HCT 43.6 42.3   MCV 88.3 89.1   MCH 30.8 30.1   MCHC 34.9 33.8   RDW 13.6 13.8    225   MPV 10.5* 10.1       Recent Labs   Lab 07/14/23  1001 07/15/23  0536    140   K 3.6 3.9   CO2 26 24   BUN 9.6 11.1   CREATININE 0.84 0.95   CALCIUM 9.6 9.4   ALBUMIN 3.9 3.4*   ALKPHOS 75 69   ALT 39 29   AST 19 17   BILITOT 0.4 0.7        Microbiology Results (last 7 days)       Procedure Component Value Units Date/Time    Wound Culture [[id number]] Collected: 07/14/23 1547    Order Status: Completed Specimen: Wound from Finger, Left Hand Updated: 07/15/23 0946     Wound Culture No Growth At 24 Hours    Wound Culture [[id number]] Collected: 07/14/23 1548    Order Status: Completed Specimen: Wound from Finger, Left Hand Updated: 07/15/23 0946     Wound Culture No Growth At 24 Hours    Wound Culture [[id number]]  (Abnormal) Collected: 07/14/23 1548    Order Status: Completed Specimen: Wound from Hand, Left Updated: 07/15/23 0925     Wound Culture Few Staphylococcus aureus    Gram Stain [[id number]] Collected: 07/14/23 1548    Order Status: Completed Specimen: Wound from Finger, Left Hand Updated: 07/15/23 0800     GRAM STAIN Few WBC observed      Few Gram positive cocci    Gram Stain [[id number]] Collected: 07/14/23 1548    Order Status: Completed Specimen: Wound from Hand, Left Updated: 07/15/23 0759     GRAM STAIN Few WBC observed      Moderate Gram positive cocci    Gram Stain [[id number]] Collected: 07/14/23 1547    Order Status: Completed Specimen: Wound from Finger, Left Hand Updated: 07/15/23 0758     GRAM STAIN Few WBC observed      Few Gram positive cocci    Fungal Culture [[id number]] Collected: 07/14/23 1547    Order Status: Sent Specimen: Wound from Finger, Left Hand Updated: 07/14/23 1613    Anaerobic Culture [[id number]] Collected: 07/14/23 1547    Order Status: Sent  Specimen: Wound from Finger, Left Hand Updated: 07/14/23 1613    Fungal Culture [[id number]] Collected: 07/14/23 1548    Order Status: Sent Specimen: Wound from Finger, Left Hand Updated: 07/14/23 1613    Anaerobic Culture [[id number]] Collected: 07/14/23 1548    Order Status: Sent Specimen: Wound from Finger, Left Hand Updated: 07/14/23 1613    Fungal Culture [[id number]] Collected: 07/14/23 1548    Order Status: Sent Specimen: Wound from Hand, Left Updated: 07/14/23 1613    Anaerobic Culture [[id number]] Collected: 07/14/23 1548    Order Status: Sent Specimen: Wound from Hand, Left Updated: 07/14/23 1613             Radiology:  X-Ray Hand 3 view Left  Narrative: EXAMINATION:  XR HAND COMPLETE 3 VIEW LEFT    CLINICAL HISTORY:  pain and swelling, suspect insect bite; worse in 4th finger;    TECHNIQUE:  Radiographs of the left hand with AP, lateral and oblique  views.    COMPARISON:  No prior imaging available for comparison    FINDINGS:  There is no acute fracture, subluxation or dislocation.    Joints and interspaces appear maintained.    Osseous structures show normal bone mineral density.    Diffuse soft tissue edema.    There are no radiopaque foreign bodies.  Impression: Diffuse soft tissue edema is noted with no acute osseous abnormality.    Electronically signed by: Cristopher Pop  Date:    07/14/2023  Time:    11:46      Scheduled Med:   piperacillin-tazobactam (Zosyn) IV (PEDS and ADULTS) (extended infusion is not appropriate)  4.5 g Intravenous Q8H    vancomycin (VANCOCIN) IV (PEDS and ADULTS)  1,500 mg Intravenous Q12H        Continuous Infusions:   lactated ringers          PRN Meds:  acetaminophen, cloNIDine, HYDROcodone-acetaminophen, HYDROmorphone, ketorolac, melatonin, ondansetron, sodium chloride 0.9%, sodium chloride 0.9%, Pharmacy to dose Vancomycin consult **AND** vancomycin - pharmacy to dose     Nutrition Status:      Assessment/Plan:    Left hand abscess s/p I&D  Elevated blood pressure      Plan  Cont zosyn and vancomycin  F/u wound cx results -staph aureus present  Monitor bp          All diagnosis and differential diagnosis have been reviewed; assessment and plan has been documented; I have personally reviewed the labs and test results that are presently available; I have reviewed the patients medication list; I have reviewed the consulting providers response and recommendations. I have reviewed or attempted to review medical records based upon their availability      _____________________________________________________________________            Fermín Salcedo MD   07/15/2023

## 2023-07-15 NOTE — HOSPITAL COURSE
07/14/2023     PRIMARY SURGEON: Azael Hendricks MD  PREOPERATIVE DIAGNOSIS:  Left hand deep soft tissue infection  Left ring finger abscess/infection     POSTOPERATIVE DIAGNOSIS:  Same     PROCEDURES:  Incision/drainage/irrigation/debridement of left dorsal hand  Incision/drainage/irrigation/debridement of left ring finger abscess

## 2023-07-15 NOTE — PROGRESS NOTES
Daily Orthopaedic Progress Note    PATIENT INFORMATION:  Sonya Henderson is a 43 y.o. male admitted on 7/14/2023  Hospital Day: 1                    1 Day Post-Op      SUBJECTIVE:  The patient was seen and examined this morning at the bedside. Patient reports no acute issues overnight and adequate control of pain on current regimen.      PHYSICAL EXAM:  General: Well-developed, well-nourished.  Neuro: Alert and oriented x 3.  Psych: Normal mood and affect.  Dressings clean and intact, Operative limb neurovascularly intact distally and sensation intact to light touch distally, capillary refill appropriate in the fingertips.      Vitals:    07/15/23 0538 07/15/23 0823 07/15/23 0824 07/15/23 0830   BP:  (!) 154/71     Pulse:  98     Resp: 18  16    Temp:  98.2 °F (36.8 °C)     TempSrc:  Oral     SpO2:  96%  96%   Weight:       Height:         I/O last 3 completed shifts:  In: 1260 [P.O.:1210; IV Piggyback:50]  Out: -     MEDICATIONS:  Scheduled Meds:   piperacillin-tazobactam (Zosyn) IV (PEDS and ADULTS) (extended infusion is not appropriate)  4.5 g Intravenous Q8H    vancomycin (VANCOCIN) IV (PEDS and ADULTS)  1,500 mg Intravenous Q12H     Continuous Infusions:   lactated ringers       PRN Meds:.acetaminophen, cloNIDine, HYDROcodone-acetaminophen, HYDROmorphone, ketorolac, melatonin, ondansetron, sodium chloride 0.9%, sodium chloride 0.9%, Pharmacy to dose Vancomycin consult **AND** vancomycin - pharmacy to dose    LABS:  Recent Lab Results         07/15/23  0536   07/14/23  1548   07/14/23  1547        Albumin/Globulin Ratio 1.1           Albumin 3.4           Alkaline Phosphatase 69           ALT 29           AST 17           Baso # 0.05           Basophil % 0.3           BILIRUBIN TOTAL 0.7           BUN 11.1           Calcium 9.4           Chloride 106           CO2 24           Creatinine 0.95           Wound Culture   No Growth At 24 Hours  [P]   No Growth At 24 Hours  [P]          Few Staphylococcus  aureus  [P]         eGFR >60           Eos # 0.17           Eosinophil % 0.9           Globulin, Total 3.1           Glucose 105           Gram Stain Result   Few WBC observed   Few WBC observed          Few Gram positive cocci   Few Gram positive cocci          Few WBC observed            Moderate Gram positive cocci         Hematocrit 42.3           Hemoglobin 14.3           Immature Grans (Abs) 0.15           Immature Granulocytes 0.8           Lymph # 2.06           LYMPH % 10.6           MCH 30.1           MCHC 33.8           MCV 89.1           Mono # 1.88           Mono % 9.6           MPV 10.1           Neut # 15.20           Neut % 77.8           nRBC 0.0           Platelets 225           Potassium 3.9           PROTEIN TOTAL 6.5           RBC 4.75           RDW 13.8           Sodium 140           WBC 19.51                    [P] - Preliminary Result               DIAGNOSTICS:  X-Ray Hand 3 view Left  Narrative: EXAMINATION:  XR HAND COMPLETE 3 VIEW LEFT    CLINICAL HISTORY:  pain and swelling, suspect insect bite; worse in 4th finger;    TECHNIQUE:  Radiographs of the left hand with AP, lateral and oblique  views.    COMPARISON:  No prior imaging available for comparison    FINDINGS:  There is no acute fracture, subluxation or dislocation.    Joints and interspaces appear maintained.    Osseous structures show normal bone mineral density.    Diffuse soft tissue edema.    There are no radiopaque foreign bodies.  Impression: Diffuse soft tissue edema is noted with no acute osseous abnormality.    Electronically signed by: Cristopher Pop  Date:    07/14/2023  Time:    11:46       A/P: 43 y.o. male 1 Day Post-Op s/p left hand and finger I and D for infection  -Continue with current pain control regimen  -continue with IV antibiotics, awaiting cultures  -patient will ambulate for DVT prophylaxis  -consult ID once cultures obtained    Azael Hendricks MD  Orthopedics and Sports Medicine

## 2023-07-15 NOTE — PLAN OF CARE
Problem: Adult Inpatient Plan of Care  Goal: Plan of Care Review  Outcome: Ongoing, Progressing  Flowsheets (Taken 7/14/2023 2117)  Plan of Care Reviewed With:   spouse   patient  Goal: Absence of Hospital-Acquired Illness or Injury  Outcome: Ongoing, Progressing  Intervention: Identify and Manage Fall Risk  Flowsheets (Taken 7/14/2023 2117)  Safety Promotion/Fall Prevention:   assistive device/personal item within reach   nonskid shoes/socks when out of bed   side rails raised x 2   family to remain at bedside   medications reviewed   instructed to call staff for mobility  Intervention: Prevent Skin Injury  Flowsheets (Taken 7/14/2023 2117)  Body Position: position changed independently     Problem: Pain Acute  Goal: Acceptable Pain Control and Functional Ability  Outcome: Ongoing, Progressing  Intervention: Prevent or Manage Pain  Flowsheets (Taken 7/14/2023 2117)  Sleep/Rest Enhancement:   awakenings minimized   regular sleep/rest pattern promoted  Sensory Stimulation Regulation: quiet environment promoted  Medication Review/Management: medications reviewed

## 2023-07-16 LAB — VANCOMYCIN TROUGH SERPL-MCNC: 9.9 UG/ML (ref 15–20)

## 2023-07-16 PROCEDURE — 80202 ASSAY OF VANCOMYCIN: CPT | Performed by: INTERNAL MEDICINE

## 2023-07-16 PROCEDURE — 25000003 PHARM REV CODE 250: Performed by: INTERNAL MEDICINE

## 2023-07-16 PROCEDURE — 11000001 HC ACUTE MED/SURG PRIVATE ROOM

## 2023-07-16 PROCEDURE — 63600175 PHARM REV CODE 636 W HCPCS: Performed by: INTERNAL MEDICINE

## 2023-07-16 PROCEDURE — 94761 N-INVAS EAR/PLS OXIMETRY MLT: CPT

## 2023-07-16 PROCEDURE — 25000003 PHARM REV CODE 250: Performed by: ORTHOPAEDIC SURGERY

## 2023-07-16 RX ADMIN — HYDROCODONE BITARTRATE AND ACETAMINOPHEN 1 TABLET: 5; 325 TABLET ORAL at 08:07

## 2023-07-16 RX ADMIN — PIPERACILLIN SODIUM AND TAZOBACTAM SODIUM 4.5 G: 4; .5 INJECTION, POWDER, LYOPHILIZED, FOR SOLUTION INTRAVENOUS at 12:07

## 2023-07-16 RX ADMIN — VANCOMYCIN HYDROCHLORIDE 1250 MG: 1.25 INJECTION, POWDER, LYOPHILIZED, FOR SOLUTION INTRAVENOUS at 06:07

## 2023-07-16 RX ADMIN — VANCOMYCIN HYDROCHLORIDE 1500 MG: 1.5 INJECTION, POWDER, LYOPHILIZED, FOR SOLUTION INTRAVENOUS at 09:07

## 2023-07-16 RX ADMIN — PIPERACILLIN SODIUM AND TAZOBACTAM SODIUM 4.5 G: 4; .5 INJECTION, POWDER, LYOPHILIZED, FOR SOLUTION INTRAVENOUS at 09:07

## 2023-07-16 RX ADMIN — HYDROCODONE BITARTRATE AND ACETAMINOPHEN 1 TABLET: 5; 325 TABLET ORAL at 12:07

## 2023-07-16 RX ADMIN — HYDROCODONE BITARTRATE AND ACETAMINOPHEN 1 TABLET: 5; 325 TABLET ORAL at 05:07

## 2023-07-16 RX ADMIN — HYDROCODONE BITARTRATE AND ACETAMINOPHEN 1 TABLET: 5; 325 TABLET ORAL at 04:07

## 2023-07-16 RX ADMIN — HYDROCODONE BITARTRATE AND ACETAMINOPHEN 1 TABLET: 5; 325 TABLET ORAL at 09:07

## 2023-07-16 RX ADMIN — PIPERACILLIN SODIUM AND TAZOBACTAM SODIUM 4.5 G: 4; .5 INJECTION, POWDER, LYOPHILIZED, FOR SOLUTION INTRAVENOUS at 04:07

## 2023-07-16 NOTE — PROGRESS NOTES
Pharmacokinetic Assessment Follow Up: IV Vancomycin    Vancomycin serum concentration assessment(s):    The trough level was drawn correctly and can be used to guide therapy at this time. The measurement is below the desired definitive target range of 10 to 20 mcg/mL.    Vancomycin Regimen Plan:    Change regimen to Vancomycin 1250 mg IV every 8 hours with next serum trough concentration measured at 0900 prior to 3rd dose on 07/17.    Drug levels (last 3 results):  Recent Labs   Lab Result Units 07/16/23  0718   Vancomycin Trough ug/ml 9.9*       Pharmacy will continue to follow and monitor vancomycin.    Please contact pharmacy at extension [phone] for questions regarding this assessment.    Thank you for the consult,   Jazz Lopez       Patient brief summary:  Sonya Henderson is a 43 y.o. male initiated on antimicrobial therapy with IV Vancomycin for treatment of skin & soft tissue infection    The patient's current regimen is 1500mg q12h.    Drug Allergies:   Review of patient's allergies indicates:  No Known Allergies    Actual Body Weight:   100.7kg    Renal Function:   Estimated Creatinine Clearance: 117.7 mL/min (based on SCr of 0.95 mg/dL).,     Dialysis Method (if applicable):  N/A    CBC (last 72 hours):  Recent Labs   Lab Result Units 07/14/23  1001 07/15/23  0536   WBC x10(3)/mcL 22.05* 19.51*   Hgb g/dL 15.2 14.3   Hct % 43.6 42.3   Platelet x10(3)/mcL 217 225   Mono % % 9.7 9.6   Eos % % 0.6 0.9   Basophil % % 0.2 0.3       Metabolic Panel (last 72 hours):  Recent Labs   Lab Result Units 07/14/23  1001 07/15/23  0536   Sodium Level mmol/L 142 140   Potassium Level mmol/L 3.6 3.9   Chloride mmol/L 108* 106   Carbon Dioxide mmol/L 26 24   Glucose Level mg/dL 85 105*   Blood Urea Nitrogen mg/dL 9.6 11.1   Creatinine mg/dL 0.84 0.95   Albumin Level g/dL 3.9 3.4*   Bilirubin Total mg/dL 0.4 0.7   Alkaline Phosphatase unit/L 75 69   Aspartate Aminotransferase unit/L 19 17   Alanine Aminotransferase  unit/L 39 29       Vancomycin Administrations:  vancomycin given in the last 96 hours                     vancomycin 1,500 mg in dextrose 5 % (D5W) 250 mL IVPB (Vial-Mate) (mg) 1,500 mg New Bag 07/16/23 0935     1,500 mg New Bag 07/15/23 2037     1,500 mg New Bag  0824    vancomycin (VANCOCIN) 1,000 mg in dextrose 5 % (D5W) 250 mL IVPB (Vial-Mate) (mg) 1,000 mg New Bag 07/14/23 2002    vancomycin (VANCOCIN) 1,000 mg in dextrose 5 % (D5W) 250 mL IVPB (Vial-Mate) (mg) 1,000 mg New Bag 07/14/23 1800                    Microbiologic Results:  Microbiology Results (last 7 days)       Procedure Component Value Units Date/Time    Wound Culture [[id number]]  (Abnormal) Collected: 07/14/23 1547    Order Status: Completed Specimen: Wound from Finger, Left Hand Updated: 07/16/23 1134     Wound Culture Moderate Staphylococcus aureus    Wound Culture [[id number]]  (Abnormal) Collected: 07/14/23 1548    Order Status: Completed Specimen: Wound from Hand, Left Updated: 07/16/23 1132     Wound Culture Few Staphylococcus aureus    Wound Culture [[id number]]  (Abnormal) Collected: 07/14/23 1548    Order Status: Completed Specimen: Wound from Finger, Left Hand Updated: 07/16/23 1131     Wound Culture Moderate Staphylococcus aureus    Anaerobic Culture [[id number]] Collected: 07/14/23 1548    Order Status: Completed Specimen: Wound from Hand, Left Updated: 07/16/23 0842     Anaerobe Culture No Anaerobes Isolated    Anaerobic Culture [[id number]] Collected: 07/14/23 1548    Order Status: Completed Specimen: Wound from Finger, Left Hand Updated: 07/16/23 0824     Anaerobe Culture No Anaerobes Isolated    Anaerobic Culture [[id number]] Collected: 07/14/23 1547    Order Status: Completed Specimen: Wound from Finger, Left Hand Updated: 07/16/23 0822     Anaerobe Culture No Anaerobes Isolated    Gram Stain [[id number]] Collected: 07/14/23 1548    Order Status: Completed Specimen: Wound from Finger, Left Hand Updated: 07/15/23 0800     GRAM STAIN Few  WBC observed      Few Gram positive cocci    Gram Stain [[id number]] Collected: 07/14/23 1548    Order Status: Completed Specimen: Wound from Hand, Left Updated: 07/15/23 0759     GRAM STAIN Few WBC observed      Moderate Gram positive cocci    Gram Stain [[id number]] Collected: 07/14/23 1547    Order Status: Completed Specimen: Wound from Finger, Left Hand Updated: 07/15/23 0758     GRAM STAIN Few WBC observed      Few Gram positive cocci    Fungal Culture [[id number]] Collected: 07/14/23 1547    Order Status: Sent Specimen: Wound from Finger, Left Hand Updated: 07/14/23 1613    Fungal Culture [[id number]] Collected: 07/14/23 1548    Order Status: Sent Specimen: Wound from Finger, Left Hand Updated: 07/14/23 1613    Fungal Culture [[id number]] Collected: 07/14/23 1548    Order Status: Sent Specimen: Wound from Hand, Left Updated: 07/14/23 1613

## 2023-07-16 NOTE — PROGRESS NOTES
[facility]  [facility] Medicine Progress Note      Patient Name: Sonya Henderson  MRN: [id number]  Admission Date: 7/14/2023   Length of Stay: 2  Attending Physician: Fermín Salcedo MD  Primary Care Provider: Primary Doctor No  Face-to-Face encounter date: 07/16/2023    Code Status: Full Code        Chief Complaint:   Insect Bite (Pt states getting bit by what he thinks is a spider. Pt went to [facility] for treatment and was sent here by [facility] to be seen by a specialist. Per the Patient)        HPI:   Sonya Henderson is a 43 y.o. male who  has a past medical history of GERD (gastroesophageal reflux disease).. The patient presented to [facility] on 7/14/2023 with a primary complaint of pain and swelling of the 4th finger on his left hand. It started 3 days ago. He works as a , he isn't sure it he had an insect bite. He reports prior skin abscesses on 2 other occasions also on his hands.     Overview/Hospital Course:  07/14/2023     PRIMARY SURGEON: Azael Hendricks MD  PREOPERATIVE DIAGNOSIS:  Left hand deep soft tissue infection  Left ring finger abscess/infection     POSTOPERATIVE DIAGNOSIS:  Same     PROCEDURES:  Incision/drainage/irrigation/debridement of left dorsal hand  Incision/drainage/irrigation/debridement of left ring finger abscess         Interval Hx:   The pt has mild hand pain 7/10. No fever, chills.    Review of Systems   All other systems reviewed and are negative.    Objective/physical exam:  General: In no acute distress, afebrile  Chest: Clear to auscultation bilaterally  Heart: RRR, +S1, S2, no appreciable murmur  Abdomen: Soft, nontender, BS +  MSK: left hand bandged  Neurologic: Alert and oriented x4    VITAL SIGNS: 24 HRS MIN & MAX LAST   Temp  Min: 97.9 °F (36.6 °C)  Max: 98.8 °F (37.1 °C) 97.9 °F (36.6 °C)   BP  Min: 123/68  Max: 153/69 123/68   Pulse  Min: 80  Max: 89  86   Resp  Min: 16  Max: 18 16   SpO2  Min: 95 %  Max: 99 % 97 %        Recent Labs   Lab 07/14/23  1001 07/15/23  0536   WBC 22.05* 19.51*   RBC 4.94 4.75   HGB 15.2 14.3   HCT 43.6 42.3   MCV 88.3 89.1   MCH 30.8 30.1   MCHC 34.9 33.8   RDW 13.6 13.8    225   MPV 10.5* 10.1         Recent Labs   Lab 07/14/23  1001 07/15/23  0536    140   K 3.6 3.9   CO2 26 24   BUN 9.6 11.1   CREATININE 0.84 0.95   CALCIUM 9.6 9.4   ALBUMIN 3.9 3.4*   ALKPHOS 75 69   ALT 39 29   AST 19 17   BILITOT 0.4 0.7          Microbiology Results (last 7 days)       Procedure Component Value Units Date/Time    Wound Culture [[id number]]  (Abnormal) Collected: 07/14/23 1547    Order Status: Completed Specimen: Wound from Finger, Left Hand Updated: 07/16/23 1134     Wound Culture Moderate Staphylococcus aureus    Wound Culture [[id number]]  (Abnormal) Collected: 07/14/23 1548    Order Status: Completed Specimen: Wound from Hand, Left Updated: 07/16/23 1132     Wound Culture Few Staphylococcus aureus    Wound Culture [[id number]]  (Abnormal) Collected: 07/14/23 1548    Order Status: Completed Specimen: Wound from Finger, Left Hand Updated: 07/16/23 1131     Wound Culture Moderate Staphylococcus aureus    Anaerobic Culture [[id number]] Collected: 07/14/23 1548    Order Status: Completed Specimen: Wound from Hand, Left Updated: 07/16/23 0842     Anaerobe Culture No Anaerobes Isolated    Anaerobic Culture [[id number]] Collected: 07/14/23 1548    Order Status: Completed Specimen: Wound from Finger, Left Hand Updated: 07/16/23 0824     Anaerobe Culture No Anaerobes Isolated    Anaerobic Culture [[id number]] Collected: 07/14/23 1547    Order Status: Completed Specimen: Wound from Finger, Left Hand Updated: 07/16/23 0822     Anaerobe Culture No Anaerobes Isolated    Gram Stain [[id number]] Collected: 07/14/23 1548    Order Status: Completed Specimen: Wound from Finger, Left Hand Updated: 07/15/23 0800     GRAM STAIN Few WBC observed      Few Gram positive cocci    Gram Stain [[id number]] Collected: 07/14/23  1548    Order Status: Completed Specimen: Wound from Hand, Left Updated: 07/15/23 0759     GRAM STAIN Few WBC observed      Moderate Gram positive cocci    Gram Stain [[id number]] Collected: 07/14/23 1547    Order Status: Completed Specimen: Wound from Finger, Left Hand Updated: 07/15/23 0758     GRAM STAIN Few WBC observed      Few Gram positive cocci    Fungal Culture [[id number]] Collected: 07/14/23 1547    Order Status: Sent Specimen: Wound from Finger, Left Hand Updated: 07/14/23 1613    Fungal Culture [[id number]] Collected: 07/14/23 1548    Order Status: Sent Specimen: Wound from Finger, Left Hand Updated: 07/14/23 1613    Fungal Culture [[id number]] Collected: 07/14/23 1548    Order Status: Sent Specimen: Wound from Hand, Left Updated: 07/14/23 1613             Radiology:  X-Ray Hand 3 view Left  Narrative: EXAMINATION:  XR HAND COMPLETE 3 VIEW LEFT    CLINICAL HISTORY:  pain and swelling, suspect insect bite; worse in 4th finger;    TECHNIQUE:  Radiographs of the left hand with AP, lateral and oblique  views.    COMPARISON:  No prior imaging available for comparison    FINDINGS:  There is no acute fracture, subluxation or dislocation.    Joints and interspaces appear maintained.    Osseous structures show normal bone mineral density.    Diffuse soft tissue edema.    There are no radiopaque foreign bodies.  Impression: Diffuse soft tissue edema is noted with no acute osseous abnormality.    Electronically signed by: Cristopher Pop  Date:    07/14/2023  Time:    11:46      Scheduled Med:   piperacillin-tazobactam (Zosyn) IV (PEDS and ADULTS) (extended infusion is not appropriate)  4.5 g Intravenous Q8H    vancomycin (VANCOCIN) IV (PEDS and ADULTS)  1,500 mg Intravenous Q12H        Continuous Infusions:         PRN Meds:  acetaminophen, cloNIDine, HYDROcodone-acetaminophen, HYDROmorphone, ketorolac, melatonin, ondansetron, sodium chloride 0.9%, sodium chloride 0.9%, Pharmacy to dose Vancomycin consult **AND**  vancomycin - pharmacy to dose     Nutrition Status:      Assessment/Plan:    Left hand abscess s/p I&D  Elevated blood pressure      Plan  Cont zosyn and vancomycin  F/u wound cx results -staph aureus sensitivity pending  Bp improved, monitor  Check am labs        All diagnosis and differential diagnosis have been reviewed; assessment and plan has been documented; I have personally reviewed the labs and test results that are presently available; I have reviewed the patients medication list; I have reviewed the consulting providers response and recommendations. I have reviewed or attempted to review medical records based upon their availability      _____________________________________________________________________            Fermín Salcedo MD   07/16/2023

## 2023-07-16 NOTE — PROGRESS NOTES
Daily Orthopaedic Progress Note    PATIENT INFORMATION:  Sonya Henderson is a 43 y.o. male admitted on 7/14/2023  Hospital Day: 2                    2 Days Post-Op      SUBJECTIVE:  Patient is doing well.  Pain in the hand is improving.  No fevers sweats or chills.      PHYSICAL EXAM:  General: Well-developed, well-nourished.  Neuro: Alert and oriented x 3.  Psych: Normal mood and affect.  Left hand: Dressing was removed, surgical incision is healing well.  There is no erythema around the incision site.  There is no fluctuance currently.  He is able to wiggle his fingers without pain.  Sensation light touch intact in median ulnar and radial distribution.  Radial pulse 2 +hand is warm well perfused      Vitals:    07/16/23 0505 07/16/23 0506 07/16/23 0901 07/16/23 0901   BP: (!) 153/69   123/68   Pulse: 80   86   Resp: 18 18 16    Temp: 98.5 °F (36.9 °C)   97.9 °F (36.6 °C)   TempSrc: Oral   Oral   SpO2: 98%   97%   Weight:       Height:         I/O last 3 completed shifts:  In: 850 [P.O.:850]  Out: -     MEDICATIONS:  Scheduled Meds:   piperacillin-tazobactam (Zosyn) IV (PEDS and ADULTS) (extended infusion is not appropriate)  4.5 g Intravenous Q8H    vancomycin (VANCOCIN) IV (PEDS and ADULTS)  1,500 mg Intravenous Q12H     Continuous Infusions:      PRN Meds:.acetaminophen, cloNIDine, HYDROcodone-acetaminophen, HYDROmorphone, ketorolac, melatonin, ondansetron, sodium chloride 0.9%, sodium chloride 0.9%, Pharmacy to dose Vancomycin consult **AND** vancomycin - pharmacy to dose    LABS:  Recent Lab Results         07/16/23  0718        Vancomycin-Trough 9.9               DIAGNOSTICS:  X-Ray Hand 3 view Left  Narrative: EXAMINATION:  XR HAND COMPLETE 3 VIEW LEFT    CLINICAL HISTORY:  pain and swelling, suspect insect bite; worse in 4th finger;    TECHNIQUE:  Radiographs of the left hand with AP, lateral and oblique  views.    COMPARISON:  No prior imaging available for comparison    FINDINGS:  There is no  acute fracture, subluxation or dislocation.    Joints and interspaces appear maintained.    Osseous structures show normal bone mineral density.    Diffuse soft tissue edema.    There are no radiopaque foreign bodies.  Impression: Diffuse soft tissue edema is noted with no acute osseous abnormality.    Electronically signed by: Cristopher Pop  Date:    07/14/2023  Time:    11:46       A/P: 43 y.o. male 2 Days Post-Op s/p left hand and finger I and D for infection  -Continue with current pain control regimen  -continue with IV antibiotics, awaiting final cultures.  Preliminary culture shows staphylococcus aureus  -patient will ambulate for DVT prophylaxis  -consult ID once cultures obtained

## 2023-07-17 VITALS
HEART RATE: 77 BPM | BODY MASS INDEX: 33.65 KG/M2 | RESPIRATION RATE: 18 BRPM | HEIGHT: 68 IN | OXYGEN SATURATION: 98 % | DIASTOLIC BLOOD PRESSURE: 79 MMHG | SYSTOLIC BLOOD PRESSURE: 149 MMHG | WEIGHT: 222 LBS | TEMPERATURE: 99 F

## 2023-07-17 LAB
ANION GAP SERPL CALC-SCNC: 9 MEQ/L
BACTERIA SPEC ANAEROBE CULT: NORMAL
BACTERIA SPEC CULT: ABNORMAL
BUN SERPL-MCNC: 11.3 MG/DL (ref 8.9–20.6)
CALCIUM SERPL-MCNC: 10 MG/DL (ref 8.4–10.2)
CHLORIDE SERPL-SCNC: 104 MMOL/L (ref 98–107)
CO2 SERPL-SCNC: 27 MMOL/L (ref 22–29)
CREAT SERPL-MCNC: 0.88 MG/DL (ref 0.73–1.18)
CREAT/UREA NIT SERPL: 13
ERYTHROCYTE [DISTWIDTH] IN BLOOD BY AUTOMATED COUNT: 13.2 % (ref 11.5–17)
GFR SERPLBLD CREATININE-BSD FMLA CKD-EPI: >60 MLS/MIN/1.73/M2
GLUCOSE SERPL-MCNC: 99 MG/DL (ref 74–100)
HCT VFR BLD AUTO: 43.9 % (ref 42–52)
HGB BLD-MCNC: 14.5 G/DL (ref 14–18)
MCH RBC QN AUTO: 30 PG (ref 27–31)
MCHC RBC AUTO-ENTMCNC: 33 G/DL (ref 33–36)
MCV RBC AUTO: 90.9 FL (ref 80–94)
NRBC BLD AUTO-RTO: 0 %
PLATELET # BLD AUTO: 275 X10(3)/MCL (ref 130–400)
PMV BLD AUTO: 10.1 FL (ref 7.4–10.4)
POTASSIUM SERPL-SCNC: 4.4 MMOL/L (ref 3.5–5.1)
RBC # BLD AUTO: 4.83 X10(6)/MCL (ref 4.7–6.1)
SODIUM SERPL-SCNC: 140 MMOL/L (ref 136–145)
VANCOMYCIN TROUGH SERPL-MCNC: 15.9 UG/ML (ref 15–20)
WBC # SPEC AUTO: 11.09 X10(3)/MCL (ref 4.5–11.5)

## 2023-07-17 PROCEDURE — 85027 COMPLETE CBC AUTOMATED: CPT | Performed by: INTERNAL MEDICINE

## 2023-07-17 PROCEDURE — 25000003 PHARM REV CODE 250: Performed by: INTERNAL MEDICINE

## 2023-07-17 PROCEDURE — 63600175 PHARM REV CODE 636 W HCPCS: Performed by: INTERNAL MEDICINE

## 2023-07-17 PROCEDURE — 80202 ASSAY OF VANCOMYCIN: CPT | Performed by: INTERNAL MEDICINE

## 2023-07-17 PROCEDURE — 94761 N-INVAS EAR/PLS OXIMETRY MLT: CPT

## 2023-07-17 PROCEDURE — 80048 BASIC METABOLIC PNL TOTAL CA: CPT | Performed by: INTERNAL MEDICINE

## 2023-07-17 PROCEDURE — 25000003 PHARM REV CODE 250: Performed by: ORTHOPAEDIC SURGERY

## 2023-07-17 RX ORDER — CLINDAMYCIN HYDROCHLORIDE 300 MG/1
300 CAPSULE ORAL EVERY 6 HOURS
Qty: 44 CAPSULE | Refills: 0 | Status: SHIPPED | OUTPATIENT
Start: 2023-07-17 | End: 2023-08-01 | Stop reason: SDUPTHER

## 2023-07-17 RX ORDER — TRAMADOL HYDROCHLORIDE 50 MG/1
50 TABLET ORAL EVERY 4 HOURS PRN
Qty: 35 TABLET | Refills: 0 | Status: SHIPPED | OUTPATIENT
Start: 2023-07-17 | End: 2023-07-24

## 2023-07-17 RX ADMIN — HYDROCODONE BITARTRATE AND ACETAMINOPHEN 1 TABLET: 5; 325 TABLET ORAL at 05:07

## 2023-07-17 RX ADMIN — HYDROCODONE BITARTRATE AND ACETAMINOPHEN 1 TABLET: 5; 325 TABLET ORAL at 12:07

## 2023-07-17 RX ADMIN — VANCOMYCIN HYDROCHLORIDE 1250 MG: 1.25 INJECTION, POWDER, LYOPHILIZED, FOR SOLUTION INTRAVENOUS at 01:07

## 2023-07-17 RX ADMIN — PIPERACILLIN SODIUM AND TAZOBACTAM SODIUM 4.5 G: 4; .5 INJECTION, POWDER, LYOPHILIZED, FOR SOLUTION INTRAVENOUS at 01:07

## 2023-07-17 RX ADMIN — VANCOMYCIN HYDROCHLORIDE 1250 MG: 1.25 INJECTION, POWDER, LYOPHILIZED, FOR SOLUTION INTRAVENOUS at 10:07

## 2023-07-17 NOTE — NURSING
Pt escorted safely and securely to private vehicle. Wife kapil(speaks English) at bedside for all discharge instructions. Both patient and family member verbalize understanding of all discharge instructions, antibiotics and follow up visit. Education on MRSA done and given to patient. Pt stable,no distress, eager for discharge.  Bandage changed before departure and bedside wound care teaching done for kapil who will be doing the wound care at home. Wound care supplies given.

## 2023-07-17 NOTE — DISCHARGE INSTRUCTIONS
[facility]  [address]  [place] [address]  Phone [phone]       /      Fax [phone]  SURGEON: Dr. Hendricks    After discharge, all questions or concerns should be handled at your surgeon's office ([phone]). If it is a weekend or after hours, you will get the surgeon on call.     Discharge Medications:    PAIN MANAGEMENT: Next Dose Available   Tylenol/Acetaminophen 500mg- every 4 hours as needed for pain Start anytime   Ultram/Tramadol 50mg (Pain Med) - every 4-6 hours AS NEEDED for pain Start anytime       COMPLICATION PREVENTION MEDS: Next Dose DUE   Clindamycin 300m capsule by mouth every 6 hours for 11 days. Start TONIGHT 2023   MiraLAX 17gm or Senokot S - once or twice a day while on narcotics constipation prevention PM on 2023                 Discharge Instructions  MEDICATIONS:    For best wound healing, NO anti-inflammatory medications (Diclofenac/Voltaren, Mobic/Meloxicam, Naproxen, Ibuprofen, Aleve, Advil, Motrin...etc), unless otherwise instructed by your surgeon.     PAIN MANAGEMENT:   Ultram (tramadol) (pain pill)- take 1 tablet every  4 hours as needed for pain. As the pain lessens, break each tablet in half and gradually reduce the use.     **NO MORE THAN 3000mg OF TYLENOL IN 24 HOURS**.           **Other things that help with pain control include: COMPRESSION WRAP, ICE and ELEVATION!!**      BLOOD CLOT PREVENTION:     WALK AROUND EVERY 2 HOURS.   Increase walking distance each day.  Stay out of bed as much as possible    CONSTIPATION PREVENTION:   Miralax or Senokot S/Marylou-colace and Stool softeners every day while on pain meds.  Use other MORE AGGRESSIVE over the counter LAXATIVES as needed for constipation (Examples - Milk of Magnesia, Dulcolax suppositories, Magnesium Citrate, Fleet's enemas...etc).  Drink lots of water  Increase Fiber in diet  Increase walking distance each day - every 2 hours, at least.     ACTIVITY:   Weight  bearing precautions as follows: NON weight bearing to operative extremity. No heavy lifting, bending, pushing, or pulling.  We encourage out of bed activities.  Walk around at least every 1-2 hours and switch positions throughout the day.     WOUND CARE:   dry occlusive dressing, start dressing change on TOMORROW 7/18/202. Change dressing every day and as needed for soiling. UNWRAP BANDAGE AND REPLACE DRESSING WITH XEROFORM(YELLOW JELLY MESH), ROLLED GAUZE, ACE WRAP BANDAGE.  NOTIFY YOUR SURGEON OF EXCESSIVE WOUND DRAINAGE.    DO NOT WET WOUND or apply any ointments, creams, lotions or antiseptics.   DO NOT TOUCH INCISION(S)  Apply ice pack AS MUCH AS POSSIBLE.    URINARY RETENTION:  If you start having difficulty urinating, decrease PAIN MEDS and call your primary care doctor or urologist.     PNEUMONIA PREVENTION:  Stay out of bed as much as possible, walk around at least every 2 hours and continue breathing exercises (Incentive Spirometry) as long as you are limited in mobility and on narcotics.     INFECTION PREVENTION:  Continue antibiotics as prescribed.  Wound care instructions as above.   DO NOT WET YOUR DRESSING/WOUND(S).   NOTIFY YOUR SURGEON OF EXCESSIVE WOUND DRAINAGE.  Use gloves and practice good handwashing before and after dressing changes.  No pets in bed or near wound (s)/dressing(s).  No Alcohol, smoking or tobacco products  IF YOU ARE DIABETIC, maintain good blood sugar control throughout your recovery.    Call your SURGEON'S OFFICE if you experience the following signs and symptoms of infection:   Unusual redness, swelling, excessive, cloudy or foul smelling drainage at the incision site.   Persistent temp greater than 102 F, unrelieved by Tylenol  Pain at surgical site, unrelieved by pain meds  Warning signs of a blood clot in your leg: (CALL YOUR DOCTOR)  New onset or Increasing pain in calf, new onset tenderness or redness above or below the knee or increasing swelling of your calf, ankle, or  foot.  Warning signs that a blood clot has traveled to your lungs: (REPORT TO THE ER)  Sudden or increase in Shortness of breath, sudden onset of chest pains, or  Localized chest pain with coughing.     For emergencies, please report to OUR ([facility] [place]) Emergency department and tell them to call Dr. Hendricks at [phone].

## 2023-07-17 NOTE — PROGRESS NOTES
[facility] - [facility]ics  Orthopedic Surgery  Progress Note:      Patient Name: Sonya Henderson  MRN: [id number]  Admission Date: 7/14/2023  Attending Provider:  Fermín Salcedo MD  Hospital Length of Stay:  3 days  Postoperative Day: 3 Days Post-Op      SUBJECTIVE:  Postop day three status post I&D of left hand/ring finger. Patient reports no acute issues overnight and adequate control of pain on current regimen.        OBJECTIVE:    Physical Examination:  General: Well-developed, well-nourished.  Neuro: Alert and oriented x 3.  Psych: Normal mood and affect.  Left upper extremity:  Incisions clean, dry, and intact. No signs of infection. Neurovascular intact distally. Sensation intact.      Vitals:    07/17/23 0026 07/17/23 0500 07/17/23 0547 07/17/23 0801   BP: 132/81 (!) 149/75  133/65   Pulse: 68 65  83   Resp: 18 18 18    Temp: 98 °F (36.7 °C) 98 °F (36.7 °C)  97.7 °F (36.5 °C)   TempSrc: Oral Oral  Oral   SpO2: 97% 98%  (!) 94%   Weight:       Height:         No intake/output data recorded.    Medications:  Scheduled Meds:   piperacillin-tazobactam (Zosyn) IV (PEDS and ADULTS) (extended infusion is not appropriate)  4.5 g Intravenous Q8H    vancomycin (VANCOCIN) IV (PEDS and ADULTS)  1,250 mg Intravenous Q8H     Continuous Infusions:  PRN Meds:.acetaminophen, cloNIDine, HYDROcodone-acetaminophen, HYDROmorphone, ketorolac, melatonin, ondansetron, sodium chloride 0.9%, sodium chloride 0.9%, Pharmacy to dose Vancomycin consult **AND** vancomycin - pharmacy to dose    Significant Labs: All pertinent labs within the past 24 hours have been reviewed.  Recent Lab Results  (Last 5 results in the past 72 hours)        07/17/23  0907   07/17/23  0516   07/16/23  0718   07/15/23  0536   07/14/23  1548        Albumin/Globulin Ratio       1.1         Albumin       3.4         Alkaline Phosphatase       69         ALT       29         Anaerobic Culture         No Anaerobes Isolated                 No Anaerobes Isolated       Anion Gap   9.0             AST       17         Baso #       0.05         Basophil %       0.3         BILIRUBIN TOTAL       0.7         BUN   11.3     11.1         BUN/CREAT RATIO   13             Calcium   10.0     9.4         Chloride   104     106         CO2   27     24         Creatinine   0.88     0.95         Wound Culture         Few Staphylococcus aureus  [P]                Moderate Staphylococcus aureus  [P]       eGFR   >60     >60         Eos #       0.17         Eosinophil %       0.9         Globulin, Total       3.1         Glucose   99     105         Gram Stain Result         Few WBC observed                Few Gram positive cocci                Few WBC observed                Moderate Gram positive cocci       Hematocrit   43.9     42.3         Hemoglobin   14.5     14.3         Immature Grans (Abs)       0.15         Immature Granulocytes       0.8         Lymph #       2.06         LYMPH %       10.6         MCH   30.0     30.1         MCHC   33.0     33.8         MCV   90.9     89.1         Mono #       1.88         Mono %       9.6         MPV   10.1     10.1         Neut #       15.20         Neut %       77.8         nRBC   0.0     0.0         Platelets   275     225         Potassium   4.4     3.9         PROTEIN TOTAL       6.5         RBC   4.83     4.75         RDW   13.2     13.8         Sodium   140     140         Vancomycin-Trough 15.9     9.9           WBC   11.09     19.51                                 [P] - Preliminary Result               Significant Imaging:  I have reviewed all pertinent imaging results/findings.  X-Ray Hand 3 view Left    Result Date: 7/14/2023  EXAMINATION: XR HAND COMPLETE 3 VIEW LEFT CLINICAL HISTORY: pain and swelling, suspect insect bite; worse in 4th finger; TECHNIQUE: Radiographs of the left hand with AP, lateral and oblique  views. COMPARISON: No prior imaging available for comparison FINDINGS: There is no acute fracture,  subluxation or dislocation. Joints and interspaces appear maintained. Osseous structures show normal bone mineral density. Diffuse soft tissue edema. There are no radiopaque foreign bodies.     Diffuse soft tissue edema is noted with no acute osseous abnormality. Electronically signed by: Cristopher Pop Date:    07/14/2023 Time:    11:46    X-Ray Hand 3 view Left  Narrative: EXAMINATION:  XR HAND COMPLETE 3 VIEW LEFT    CLINICAL HISTORY:  pain and swelling, suspect insect bite; worse in 4th finger;    TECHNIQUE:  Radiographs of the left hand with AP, lateral and oblique  views.    COMPARISON:  No prior imaging available for comparison    FINDINGS:  There is no acute fracture, subluxation or dislocation.    Joints and interspaces appear maintained.    Osseous structures show normal bone mineral density.    Diffuse soft tissue edema.    There are no radiopaque foreign bodies.  Impression: Diffuse soft tissue edema is noted with no acute osseous abnormality.    Electronically signed by: Cristopher Pop  Date:    07/14/2023  Time:    11:46         ASSESSMENT/PLAN: 43 y.o. male 3 Days Post-Op s/p left hand/ring finger I and D  -Continue with current pain control regimen  -AM WBC count WNL  -Final intraoperative Gram stain with few to moderate Gram-positive cocci.  Preliminary intraoperative wound culture of left ring finger with moderate Staphylococcus aureus.  Preliminary intraoperative wound culture of left hand with few Staphylococcus aureus.  No anaerobes, fungal culture pending.  -Continue IV antibiotics per primary team  -Continue daily dressing changes with Xeroform, Kerlix/gauze, Ace wrap    The above findings, diagnostics, and treatment plan were discussed with Azael Hendricks MD who is in agreement with the plan of care except as stated in additional documentation.    PADILLA Muhammad  Orthopedic Surgery  [facility] - [facility]ics    This note was created with the assistance of voice  recognition software or phone dictation.  There may be transcription errors as a result of using this technology however minimal. Effort has been made to assure accuracy of transcription but any obvious errors or omissions should be clarified with the author of the document.

## 2023-07-17 NOTE — PLAN OF CARE
Problem: Adult Inpatient Plan of Care  Goal: Plan of Care Review  Outcome: Ongoing, Progressing  Flowsheets (Taken 7/16/2023 2133)  Plan of Care Reviewed With: patient     Problem: Pain Acute  Goal: Acceptable Pain Control and Functional Ability  Outcome: Ongoing, Progressing  Intervention: Develop Pain Management Plan  Flowsheets (Taken 7/16/2023 2133)  Pain Management Interventions:   around-the-clock dosing utilized   medication offered   position adjusted   relaxation techniques promoted

## 2023-07-17 NOTE — DISCHARGE SUMMARY
[facility] ORTHOPAEDIC   [facility] Medicine Discharge Summary    Admit Date: 7/14/2023  Discharge Date and Time: 7/17/202311:23 AM  Admitting Physician: [unfilled]  Discharging Physician: Fermín Curran MD.  Primary Care Physician: Primary Doctor No  Consults (From admission, onward)          Status Ordering Provider     Pharmacy to dose Vancomycin consult  Once        Provider:  (Not yet assigned)   [facility].    Acknowledged FERMÍN CURRAN     Inpatient consult to Orthopedic Surgery  Once        Provider:  Azael Hendricks MD    Acknowledged FERMÍN CURRAN                 Discharge Diagnoses:  Left hand abscess with MRSA s/p I&D  Elevated blood pressure       HPI  Sonya Henderson is a 43 y.o. male who  has a past medical history of GERD (gastroesophageal reflux disease).. The patient presented to [facility] on 7/14/2023 with a primary complaint of pain and swelling of the 4th finger on his left hand. It started 3 days ago. He works as a , he isn't sure it he had an insect bite. He reports prior skin abscesses on 2 other occasions also on his hands.       Hospital Course:   07/14/2023     PRIMARY SURGEON: Azael Hendricks MD  PREOPERATIVE DIAGNOSIS:  Left hand deep soft tissue infection  Left ring finger abscess/infection     POSTOPERATIVE DIAGNOSIS:  Same     PROCEDURES:  Incision/drainage/irrigation/debridement of left dorsal hand  Incision/drainage/irrigation/debridement of left ring finger abscess  The patient was started on zosyn and vancomycin empirically. His wound culture from 7/14 grew MRSA. Sensitive to clindamycin among other antibiotics. His wbc has normalized and hand is improving. He is cleared for dc home on oral abx with clindamycin x 11 days for a 2 week total course of abx. He will f/u with Dr. Hendricks in clinic.    Pt was seen and examined on the day of discharge.    Vitals:  VITAL SIGNS: 24 HRS MIN & MAX LAST   Temp  Min:  97.6 °F (36.4 °C)  Max: 98 °F (36.7 °C) 97.7 °F (36.5 °C)   BP  Min: 130/73  Max: 156/75 133/65   Pulse  Min: 65  Max: 83  83   Resp  Min: 16  Max: 18 18   SpO2  Min: 94 %  Max: 99 % (!) 94 %       Physical Exam:  General: In no acute distress, afebrile  Chest: Clear to auscultation bilaterally  Heart: RRR, +S1, S2, no appreciable murmur  Abdomen: Soft, nontender, BS +  MSK: left hand bandged  Neurologic: Alert and oriented x4      Procedures Performed:   No admission procedures for hospital encounter.     Significant Diagnostic Studies: See Full reports for all details    Recent Labs   Lab 07/14/23  1001 07/15/23  0536 07/17/23  0516   WBC 22.05* 19.51* 11.09   RBC 4.94 4.75 4.83   HGB 15.2 14.3 14.5   HCT 43.6 42.3 43.9   MCV 88.3 89.1 90.9   MCH 30.8 30.1 30.0   MCHC 34.9 33.8 33.0   RDW 13.6 13.8 13.2    225 275   MPV 10.5* 10.1 10.1       Recent Labs   Lab 07/14/23  1001 07/15/23  0536 07/17/23  0516    140 140   K 3.6 3.9 4.4   CO2 26 24 27   BUN 9.6 11.1 11.3   CREATININE 0.84 0.95 0.88   CALCIUM 9.6 9.4 10.0   ALBUMIN 3.9 3.4*  --    ALKPHOS 75 69  --    ALT 39 29  --    AST 19 17  --    BILITOT 0.4 0.7  --         Microbiology Results (last 7 days)       Procedure Component Value Units Date/Time    Wound Culture [[id number]]  (Abnormal)  (Susceptibility) Collected: 07/14/23 1548    Order Status: Completed Specimen: Wound from Hand, Left Updated: 07/17/23 1046     Wound Culture Few Methicillin resistant Staphylococcus aureus    Wound Culture [[id number]]  (Abnormal)  (Susceptibility) Collected: 07/14/23 1548    Order Status: Completed Specimen: Wound from Finger, Left Hand Updated: 07/17/23 1046     Wound Culture Moderate Methicillin resistant Staphylococcus aureus    Anaerobic Culture [[id number]] Collected: 07/14/23 1548    Order Status: Completed Specimen: Wound from Hand, Left Updated: 07/17/23 0750     Anaerobe Culture No Anaerobes Isolated    Anaerobic Culture [[id number]] Collected: 07/14/23  1548    Order Status: Completed Specimen: Wound from Finger, Left Hand Updated: 07/17/23 0750     Anaerobe Culture No Anaerobes Isolated    Anaerobic Culture [[id number]] Collected: 07/14/23 1547    Order Status: Completed Specimen: Wound from Finger, Left Hand Updated: 07/17/23 0749     Anaerobe Culture No Anaerobes Isolated    Wound Culture [[id number]]  (Abnormal) Collected: 07/14/23 1547    Order Status: Completed Specimen: Wound from Finger, Left Hand Updated: 07/16/23 1134     Wound Culture Moderate Staphylococcus aureus    Gram Stain [[id number]] Collected: 07/14/23 1548    Order Status: Completed Specimen: Wound from Finger, Left Hand Updated: 07/15/23 0800     GRAM STAIN Few WBC observed      Few Gram positive cocci    Gram Stain [[id number]] Collected: 07/14/23 1548    Order Status: Completed Specimen: Wound from Hand, Left Updated: 07/15/23 0759     GRAM STAIN Few WBC observed      Moderate Gram positive cocci    Gram Stain [[id number]] Collected: 07/14/23 1547    Order Status: Completed Specimen: Wound from Finger, Left Hand Updated: 07/15/23 0758     GRAM STAIN Few WBC observed      Few Gram positive cocci    Fungal Culture [[id number]] Collected: 07/14/23 1547    Order Status: Sent Specimen: Wound from Finger, Left Hand Updated: 07/14/23 1613    Fungal Culture [[id number]] Collected: 07/14/23 1548    Order Status: Sent Specimen: Wound from Finger, Left Hand Updated: 07/14/23 1613    Fungal Culture [[id number]] Collected: 07/14/23 1548    Order Status: Sent Specimen: Wound from Hand, Left Updated: 07/14/23 1613             X-Ray Hand 3 view Left  Narrative: EXAMINATION:  XR HAND COMPLETE 3 VIEW LEFT    CLINICAL HISTORY:  pain and swelling, suspect insect bite; worse in 4th finger;    TECHNIQUE:  Radiographs of the left hand with AP, lateral and oblique  views.    COMPARISON:  No prior imaging available for comparison    FINDINGS:  There is no acute fracture, subluxation or dislocation.    Joints and interspaces  appear maintained.    Osseous structures show normal bone mineral density.    Diffuse soft tissue edema.    There are no radiopaque foreign bodies.  Impression: Diffuse soft tissue edema is noted with no acute osseous abnormality.    Electronically signed by: Cristopher Pop  Date:    07/14/2023  Time:    11:46         Medication List        START taking these medications      clindamycin 300 MG capsule  Commonly known as: CLEOCIN  Take 1 capsule (300 mg total) by mouth every 6 (six) hours. for 11 days     traMADoL 50 mg tablet  Commonly known as: ULTRAM  Take 1 tablet (50 mg total) by mouth every 4 (four) hours as needed for Pain.            CONTINUE taking these medications      diclofenac 75 MG EC tablet  Commonly known as: VOLTAREN  Take 1 tablet (75 mg total) by mouth 2 (two) times daily as needed (pain).     omeprazole 20 MG tablet  Commonly known as: PRILOSEC OTC            STOP taking these medications      HYDROcodone-acetaminophen 5-325 mg per tablet  Commonly known as: NORCO               Where to Get Your Medications        You can get these medications from any pharmacy    Bring a paper prescription for each of these medications  clindamycin 300 MG capsule  traMADoL 50 mg tablet          Explained in detail to the patient the discharge plan, medications, and follow-up visits. Pt understands and agrees with the treatment plan.  Discharge Disposition: Home or Self Care  Discharged Condition: stable  Diet-   Dietary Orders (From admission, onward)       Start     Ordered    07/14/23 1654  Diet Adult Regular  Diet effective now         07/14/23 1654                   Medications Per DC med rec  Activities as tolerated   Follow-up Information       Azael Hendricks MD. Go on 8/1/2023.    Specialty: Orthopedic Surgery  Why: @3:30pm  wound eval/suture removal  Contact information:  [address]  [facility] [id number] [phone]                           For further questions contact hospitalist  office.    Discharge time >30 minutes    For worsening symptoms, chest pain, shortness of breath, increased abdominal pain, high grade fever, stroke or stroke like symptoms, immediately go to the nearest Emergency Room or call 911 as soon as possible.      Fermín Shen M.D, on 7/17/2023. at 11:23 AM.

## 2023-07-19 ENCOUNTER — PATIENT OUTREACH (OUTPATIENT)
Dept: ADMINISTRATIVE | Facility: CLINIC | Age: 43
End: 2023-07-19

## 2023-07-19 LAB
BACTERIA BLD CULT: NORMAL
BACTERIA BLD CULT: NORMAL

## 2023-07-19 NOTE — PROGRESS NOTES
C3 nurse spoke with Loretta for a TCC post hospital discharge follow up call. Call placed via  Javier ID Number [id number]. The patient has a scheduled HOSFU appointment with Dr. Hendricks on 08/01/2023 @ 330 pm.

## 2023-08-01 ENCOUNTER — OFFICE VISIT (OUTPATIENT)
Dept: ORTHOPEDICS | Facility: CLINIC | Age: 43
End: 2023-08-01

## 2023-08-01 VITALS — BODY MASS INDEX: 33.65 KG/M2 | HEIGHT: 68 IN | WEIGHT: 222 LBS

## 2023-08-01 DIAGNOSIS — S61.452D: Primary | ICD-10-CM

## 2023-08-01 DIAGNOSIS — L08.9: Primary | ICD-10-CM

## 2023-08-01 PROCEDURE — 99024 PR POST-OP FOLLOW-UP VISIT: ICD-10-PCS | Mod: ,,, | Performed by: ORTHOPAEDIC SURGERY

## 2023-08-01 PROCEDURE — 99024 POSTOP FOLLOW-UP VISIT: CPT | Mod: ,,, | Performed by: ORTHOPAEDIC SURGERY

## 2023-08-01 RX ORDER — CLINDAMYCIN HYDROCHLORIDE 300 MG/1
300 CAPSULE ORAL EVERY 6 HOURS
Qty: 56 CAPSULE | Refills: 0 | Status: SHIPPED | OUTPATIENT
Start: 2023-08-01 | End: 2023-08-15

## 2023-08-01 RX ORDER — CLINDAMYCIN HYDROCHLORIDE 300 MG/1
300 CAPSULE ORAL EVERY 6 HOURS
Qty: 56 CAPSULE | Refills: 0 | Status: SHIPPED | OUTPATIENT
Start: 2023-08-01 | End: 2023-08-01

## 2023-08-01 RX ORDER — TRAMADOL HYDROCHLORIDE 50 MG/1
50 TABLET ORAL EVERY 4 HOURS PRN
COMMUNITY

## 2023-08-01 NOTE — PROGRESS NOTES
Orthopaedic Clinic  Orthopedic Clinic Note      Chief Complaint:   Chief Complaint   Patient presents with    Post-op Evaluation     2wk sp I&D left hand sx 7/14/23 gl 10/12/23. [id number] (translation ID number). pt states he has been doing good since surgery and states no pain or complaints     Referring Physician: No ref. provider found      History of Present Illness:    07/14/2023 PROCEDURES:  Incision/drainage/irrigation/debridement of left dorsal hand  Incision/drainage/irrigation/debridement of left ring finger abscess  08/01/2023 patient presents 2 weeks status post the above-noted procedure.  He states that he is doing well with no major issues or concerns.  He denies any residual pain in the operative hand.  He is continuing his previously prescribed clindamycin for treatment of MRSA identified on intraoperative cultures.      Past Medical History:   Diagnosis Date    GERD (gastroesophageal reflux disease)        Past Surgical History:   Procedure Laterality Date    IRRIGATION AND DEBRIDEMENT OF UPPER EXTREMITY Left 7/14/2023    Procedure: IRRIGATION AND DEBRIDEMENT, UPPER EXTREMITY;  Surgeon: Azael Hendricks MD;  Location: [facility];  Service: Orthopedics;  Laterality: Left;  Hand table       Current Outpatient Medications   Medication Sig    diclofenac (VOLTAREN) 75 MG EC tablet Take 1 tablet (75 mg total) by mouth 2 (two) times daily as needed (pain).    omeprazole (PRILOSEC OTC) 20 MG tablet Take 20 mg by mouth once daily.    traMADoL (ULTRAM) 50 mg tablet Take 50 mg by mouth every 4 (four) hours as needed for Pain.    clindamycin (CLEOCIN) 300 MG capsule Take 1 capsule (300 mg total) by mouth every 6 (six) hours. for 14 days     No current facility-administered medications for this visit.       Review of patient's allergies indicates:  No Known Allergies    Family History   Problem Relation Age of Onset    No Known Problems Mother     No Known Problems Father        Social History     Socioeconomic History  "   Marital status:    Tobacco Use    Smoking status: Never    Smokeless tobacco: Never   Substance and Sexual Activity    Alcohol use: Never    Drug use: Not Currently    Sexual activity: Yes     Partners: Female           Review of Systems:  All review of systems negative except for those stated in the HPI.    Examination:    Vital Signs:    Vitals:    08/01/23 1559   Weight: 100.7 kg (222 lb)   Height: 5' 7.99" (1.727 m)       Body mass index is 33.76 kg/m².    Physical Examination:  General: Well-developed, well-nourished.  Neuro: Alert and oriented x 3.  Psych: Normal mood and affect.  Card: Regular rate and rhythm  Resp: Respirations regular and unlabored  Left Hand Exam:  Swelling over the dorsum of the hand, improved from preop. Minimal tenderness to palpation over the dorsum of the hand.  Range of motion within functional limits.  Surgical incision over the ring finger improved, sutures have dissolved.  No active drainage or surrounding erythema.  Incision over the dorsum of the hand clean, dry, with sutures intact.  No active drainage.  There is minimal erythema surrounding the surgical incision.  Hand is well perfused with CR less than 2 seconds.  2+ radial pulse.  Sensation intact to light touch.      Assessment: Infection of left hand due to bite, subsequent encounter  -     Discontinue: clindamycin (CLEOCIN) 300 MG capsule; Take 1 capsule (300 mg total) by mouth every 6 (six) hours. for 14 days  Dispense: 56 capsule; Refill: 0  -     clindamycin (CLEOCIN) 300 MG capsule; Take 1 capsule (300 mg total) by mouth every 6 (six) hours. for 14 days  Dispense: 56 capsule; Refill: 0        Plan:  Assessment and plan of care communicated via assistance from  (ID: [id number]) sutures removed.  He will continue oral antibiotics for an additional 2 weeks, prescription routed.  Continue to keep the incisions clean and dry.  Continue over-the-counter medications as needed for pain.  Range of " motion of the hand as tolerated.  He will return to clinic in approximately 2 weeks for re-evaluation.  He and his spouse verbalized understanding of the plan of care with no further questions.    Azael Hendricks MD personally performed the services described in this documentation, including but not limited to patient's history, physical examination, and assessment and plan of care. All medical record entries made by IRENE Muhammad were performed at his direction and in his presence. The medical record was reviewed and is accurate and complete.         Follow up in about 2 weeks (around 8/15/2023) for Reevaluation.      DISCLAIMER: This note may have been dictated using voice recognition software and may contain grammatical errors.     NOTE: Consult report sent to referring provider via [facility] [employer].

## 2023-08-14 LAB
FUNGUS SPEC CULT: NORMAL

## 2024-01-31 ENCOUNTER — HOSPITAL ENCOUNTER (EMERGENCY)
Facility: HOSPITAL | Age: 44
Discharge: HOME | End: 2024-01-31
Attending: STUDENT IN AN ORGANIZED HEALTH CARE EDUCATION/TRAINING PROGRAM

## 2024-01-31 VITALS
HEIGHT: 70 IN | TEMPERATURE: 98 F | RESPIRATION RATE: 18 BRPM | DIASTOLIC BLOOD PRESSURE: 99 MMHG | WEIGHT: 190 LBS | OXYGEN SATURATION: 100 % | SYSTOLIC BLOOD PRESSURE: 157 MMHG | HEART RATE: 70 BPM | BODY MASS INDEX: 27.2 KG/M2

## 2024-01-31 DIAGNOSIS — L02.811 ABSCESS OF SCALP: Primary | ICD-10-CM

## 2024-01-31 PROCEDURE — 25000003 PHARM REV CODE 250: Performed by: STUDENT IN AN ORGANIZED HEALTH CARE EDUCATION/TRAINING PROGRAM

## 2024-01-31 PROCEDURE — 10060 I&D ABSCESS SIMPLE/SINGLE: CPT

## 2024-01-31 PROCEDURE — 99284 EMERGENCY DEPT VISIT MOD MDM: CPT | Mod: 25

## 2024-01-31 RX ORDER — LIDOCAINE HYDROCHLORIDE 10 MG/ML
5 INJECTION INFILTRATION; PERINEURAL
Status: COMPLETED | OUTPATIENT
Start: 2024-01-31 | End: 2024-01-31

## 2024-01-31 RX ORDER — MUPIROCIN 20 MG/G
OINTMENT TOPICAL 2 TIMES DAILY
Qty: 30 G | Refills: 0 | Status: SHIPPED | OUTPATIENT
Start: 2024-01-31 | End: 2024-02-14

## 2024-01-31 RX ORDER — SULFAMETHOXAZOLE AND TRIMETHOPRIM 800; 160 MG/1; MG/1
1 TABLET ORAL 2 TIMES DAILY
Qty: 14 TABLET | Refills: 0 | Status: SHIPPED | OUTPATIENT
Start: 2024-01-31 | End: 2024-02-07

## 2024-01-31 RX ADMIN — LIDOCAINE HYDROCHLORIDE 5 ML: 10 INJECTION, SOLUTION INFILTRATION; PERINEURAL at 10:01

## 2024-01-31 NOTE — ED PROVIDER NOTES
Encounter Date: 1/31/2024       History     Chief Complaint   Patient presents with    Abscess     Pt concerned at infection to scalp at back of head x 2 days     The history is provided by the patient. The history is limited by a language barrier. A  was used.       44-year-old  male presents emergency department for abscess to the back of his head.  Patient states it started about 2-3 days ago.  States he had infection to his finger in the past.  He is concerned that he gets recurrent infections.  Denies any fever or chills.    Review of patient's allergies indicates:  No Known Allergies  Past Medical History:   Diagnosis Date    GERD (gastroesophageal reflux disease)      Past Surgical History:   Procedure Laterality Date    IRRIGATION AND DEBRIDEMENT OF UPPER EXTREMITY Left 7/14/2023    Procedure: IRRIGATION AND DEBRIDEMENT, UPPER EXTREMITY;  Surgeon: Azael Hendricks MD;  Location: [facility];  Service: Orthopedics;  Laterality: Left;  Hand table     Family History   Problem Relation Age of Onset    No Known Problems Mother     No Known Problems Father      Social History     Tobacco Use    Smoking status: Never    Smokeless tobacco: Never   Substance Use Topics    Alcohol use: Never    Drug use: Not Currently     Review of Systems   Constitutional:  Negative for fever.   HENT:  Negative for sore throat.    Respiratory:  Negative for cough and shortness of breath.    Cardiovascular:  Negative for chest pain.   Gastrointestinal:  Negative for abdominal pain, constipation, diarrhea, nausea and vomiting.   Genitourinary:  Negative for dysuria.   Musculoskeletal:  Negative for back pain.   Skin:  Positive for wound. Negative for rash.   Neurological:  Negative for weakness and headaches.   Hematological:  Does not bruise/bleed easily.   All other systems reviewed and are negative.      Physical Exam     Initial Vitals [01/31/24 1002]   BP Pulse Resp Temp SpO2   (!) 157/99 70 18 98.2 °F (36.8  °C) 100 %      MAP       --         Physical Exam    Nursing note and vitals reviewed.  Constitutional: He appears well-developed and well-nourished. No distress.   Cardiovascular:  Normal rate and regular rhythm.           Pulmonary/Chest: Breath sounds normal. No respiratory distress.   Abdominal: Abdomen is soft. There is no abdominal tenderness.   Musculoskeletal:         General: No tenderness. Normal range of motion.     Neurological: He is alert and oriented to person, place, and time.   Skin: Skin is warm. Capillary refill takes less than 2 seconds. Abscess (small 2 x 2 cm hard abscess to the posterior scalp) noted.         ED Course   I & D - Incision and Drainage    Date/Time: 1/31/2024 10:48 AM  Location procedure was performed: [facility] EMERGENCY DEPARTMENT    Performed by: Nader Rich MD  Authorized by: Nader Rich MD  Consent Done: Yes  Consent: Verbal consent obtained.  Risks and benefits: risks, benefits and alternatives were discussed  Consent given by: patient  Type: abscess  Body area: head/neck  Location details: scalp  Anesthesia: local infiltration    Anesthesia:  Local Anesthetic: lidocaine 1% without epinephrine    Patient sedated: no  Scalpel size: 11  Incision type: single straight  Incision depth: dermal  Complexity: simple  Drainage: pus and bloody  Drainage amount: scant  Wound treatment: incision, drainage, expression of material, wound left open and deloculation  Packing material: none  Complications: No  Patient tolerance: Patient tolerated the procedure well with no immediate complications    Incision depth: dermal        Labs Reviewed - No data to display       Imaging Results    None          Medications   LIDOcaine HCL 10 mg/ml (1%) injection 5 mL (5 mLs Infiltration Given 1/31/24 1040)     Medical Decision Making  differential diagnosis  Abscess, cellulitis, inclusion cyst,  as well as multiple other possible etiologies      Problems Addressed:  Abscess of scalp:  acute illness or injury    Risk  Prescription drug management.                                      Clinical Impression:  Final diagnoses:  [L02.811] Abscess of scalp (Primary)          ED Disposition Condition    Discharge Stable          ED Prescriptions       Medication Sig Dispense Start Date End Date Auth. Provider    sulfamethoxazole-trimethoprim 800-160mg (BACTRIM DS) 800-160 mg Tab Take 1 tablet by mouth 2 (two) times daily. for 7 days 14 tablet 1/31/2024 2/7/2024 Nader Rich MD    mupirocin (BACTROBAN) 2 % ointment by Nasal route 2 (two) times daily. for 14 days 30 g 1/31/2024 2/14/2024 Nader Rich MD          Follow-up Information       Follow up With Specialties Details Why Contact Info    [facility] - Emergency Dept Emergency Medicine Go to  If symptoms worsen [id number]  [facility] [address]  [phone]             Nader Rich MD  01/31/24 4640

## 2024-02-03 ENCOUNTER — HOSPITAL ENCOUNTER (EMERGENCY)
Facility: HOSPITAL | Age: 44
Discharge: HOME | End: 2024-02-03
Attending: EMERGENCY MEDICINE

## 2024-02-03 VITALS
WEIGHT: 190 LBS | BODY MASS INDEX: 27.2 KG/M2 | HEART RATE: 72 BPM | TEMPERATURE: 98 F | HEIGHT: 70 IN | OXYGEN SATURATION: 97 % | RESPIRATION RATE: 18 BRPM | DIASTOLIC BLOOD PRESSURE: 71 MMHG | SYSTOLIC BLOOD PRESSURE: 128 MMHG

## 2024-02-03 DIAGNOSIS — L02.91 ABSCESS: Primary | ICD-10-CM

## 2024-02-03 PROCEDURE — 96372 THER/PROPH/DIAG INJ SC/IM: CPT | Performed by: EMERGENCY MEDICINE

## 2024-02-03 PROCEDURE — 10060 I&D ABSCESS SIMPLE/SINGLE: CPT

## 2024-02-03 PROCEDURE — 63600175 PHARM REV CODE 636 W HCPCS: Mod: JZ,JG | Performed by: EMERGENCY MEDICINE

## 2024-02-03 PROCEDURE — 99284 EMERGENCY DEPT VISIT MOD MDM: CPT | Mod: 25

## 2024-02-03 PROCEDURE — 25000003 PHARM REV CODE 250: Performed by: EMERGENCY MEDICINE

## 2024-02-03 RX ORDER — LIDOCAINE HYDROCHLORIDE 20 MG/ML
5 INJECTION, SOLUTION INFILTRATION; PERINEURAL
Status: COMPLETED | OUTPATIENT
Start: 2024-02-03 | End: 2024-02-03

## 2024-02-03 RX ORDER — CLINDAMYCIN HYDROCHLORIDE 150 MG/1
300 CAPSULE ORAL 4 TIMES DAILY
Qty: 40 CAPSULE | Refills: 0 | Status: SHIPPED | OUTPATIENT
Start: 2024-02-03 | End: 2024-02-08

## 2024-02-03 RX ORDER — MORPHINE SULFATE 4 MG/ML
4 INJECTION, SOLUTION INTRAMUSCULAR; INTRAVENOUS
Status: COMPLETED | OUTPATIENT
Start: 2024-02-03 | End: 2024-02-03

## 2024-02-03 RX ORDER — ONDANSETRON 4 MG/1
4 TABLET, ORALLY DISINTEGRATING ORAL
Status: COMPLETED | OUTPATIENT
Start: 2024-02-03 | End: 2024-02-03

## 2024-02-03 RX ADMIN — LIDOCAINE HYDROCHLORIDE 5 ML: 20 INJECTION, SOLUTION INFILTRATION; PERINEURAL at 01:02

## 2024-02-03 RX ADMIN — ONDANSETRON 4 MG: 4 TABLET, ORALLY DISINTEGRATING ORAL at 12:02

## 2024-02-03 RX ADMIN — MORPHINE SULFATE 4 MG: 4 INJECTION, SOLUTION INTRAMUSCULAR; INTRAVENOUS at 12:02

## 2024-02-03 NOTE — ED PROVIDER NOTES
"Encounter Date: 2/3/2024       History     Chief Complaint   Patient presents with    Abscess     Seen here for abscess to left posterior scalp. C/o pain is worse and swelling. Denies fever.       line utilized:  44-year-old male states he came to the emergency room a few days ago and had a "infected pimple" drained.  He states it was not packed and now it closed and needs to be drained again.  He denies fevers chills and sweats.  He states he has no allergies.  His wife is here and states that she will drive him home.      Review of patient's allergies indicates:  No Known Allergies  Past Medical History:   Diagnosis Date    GERD (gastroesophageal reflux disease)      Past Surgical History:   Procedure Laterality Date    IRRIGATION AND DEBRIDEMENT OF UPPER EXTREMITY Left 7/14/2023    Procedure: IRRIGATION AND DEBRIDEMENT, UPPER EXTREMITY;  Surgeon: Azael Hendricks MD;  Location: [facility];  Service: Orthopedics;  Laterality: Left;  Hand table     Family History   Problem Relation Age of Onset    No Known Problems Mother     No Known Problems Father      Social History     Tobacco Use    Smoking status: Never    Smokeless tobacco: Never   Substance Use Topics    Alcohol use: Never    Drug use: Not Currently     Review of Systems    Physical Exam     Initial Vitals [02/03/24 1119]   BP Pulse Resp Temp SpO2   112/63 76 17 98.2 °F (36.8 °C) 98 %      MAP       --         Physical Exam    Nursing note and vitals reviewed.  Constitutional: He appears well-developed.   HENT:   The left inferior occipital scalp has a 3 x 3 cm swollen, fluctuant, tender warm region consistent with an abscess.   Eyes: Conjunctivae are normal.   Cardiovascular:  Normal rate, regular rhythm and normal heart sounds.           Pulmonary/Chest: Breath sounds normal.   Abdominal: Abdomen is soft. Bowel sounds are normal. He exhibits no distension. There is no abdominal tenderness.   Musculoskeletal:         General: No edema. "     Lymphadenopathy:     He has no cervical adenopathy.   Neurological: He is alert.   Skin: Skin is warm.   Psychiatric: He has a normal mood and affect.         ED Course   I & D - Incision and Drainage    Date/Time: 2/3/2024 1:23 PM  Location procedure was performed: [facility] EMERGENCY DEPARTMENT    Performed by: Miguelito Dias Jr., MD  Authorized by: Miguelito Dias Jr., MD  Assisting provider: Miguelito Dias Jr., MD  Pre-operative diagnosis: abscess  Post-operative diagnosis: abscess  Consent Done: Not Needed  Type: abscess  Body area: head/neck  Location details: scalp  Anesthesia: local infiltration    Anesthesia:  Local Anesthetic: lidocaine 2% without epinephrine    Patient sedated: no  Description of findings: purulent discharge   Scalpel size: 11  Incision type: single straight  Incision depth: dermal  Complexity: simple  Drainage: pus  Drainage amount: moderate  Wound treatment: wound packed  Packing material: 1/4 in iodoform gauze  Technical procedures used: loculations broken with hemostats  Significant surgical tasks conducted by the assistant(s): above  Complications: No  Estimated blood loss (mL): 1  Specimens: No  Implants: No  Patient tolerance: Patient tolerated the procedure well with no immediate complications    Incision depth: dermal        Labs Reviewed - No data to display       Imaging Results    None          Medications   morphine injection 4 mg (4 mg Intramuscular Given 2/3/24 1255)   ondansetron disintegrating tablet 4 mg (4 mg Oral Given 2/3/24 1255)   LIDOcaine HCL 20 mg/ml (2%) injection 5 mL (5 mLs Infiltration Given 2/3/24 1310)     Medical Decision Making  Differential diagnosis:  Abscess, cellulitis    Amount and/or Complexity of Data Reviewed  Independent Historian: spouse  External Data Reviewed: notes.    Risk  Prescription drug management.                                      Clinical Impression:  Final diagnoses:  [L02.91] Abscess (Primary)          ED Disposition Condition     Discharge Stable          ED Prescriptions       Medication Sig Dispense Start Date End Date Auth. Provider    clindamycin (CLEOCIN) 150 MG capsule Take 2 capsules (300 mg total) by mouth 4 (four) times daily. for 5 days 40 capsule 2/3/2024 2/8/2024 Miguelito Dias Jr., MD          Follow-up Information       Follow up With Specialties Details Why Contact Info    see your MD within 2 days  In 2 days               Miguelito Dias Jr., MD  02/03/24 0095

## (undated) DEVICE — SOL NACL IRR 3000ML

## (undated) DEVICE — PAD CAST SPECIALIST STRL 4

## (undated) DEVICE — APPLICATOR CHLORAPREP ORN 26ML

## (undated) DEVICE — SPONGE GAUZE 16PLY 4X4

## (undated) DEVICE — DRAPE STERI U-SHAPED 47X51IN

## (undated) DEVICE — Device

## (undated) DEVICE — GLOVE PROTEXIS HYDROGEL SZ6.5

## (undated) DEVICE — GLOVE 6.5 PROTEXIS PI BLUE

## (undated) DEVICE — BLADE SURG STAINLESS STEEL #15

## (undated) DEVICE — PAD ABD 8X10 STERILE

## (undated) DEVICE — CULTSWAB+ AMIES W/O CHARC DBL

## (undated) DEVICE — SPONGE LAP STRL 18X18IN

## (undated) DEVICE — SUT VICRYL 2-0 8-18 CP-2

## (undated) DEVICE — BANDAGE ACE ELASTIC 6"

## (undated) DEVICE — GLOVE PROTEXIS BLUE LATEX 9

## (undated) DEVICE — CUFF ATS 2 PORT SNGL BLDR 18IN

## (undated) DEVICE — DRAPE HAND STERILE

## (undated) DEVICE — DRESSING XEROFORM FOIL PK 1X8

## (undated) DEVICE — KIT SURGICAL TURNOVER

## (undated) DEVICE — GLOVE PROTEXIS HYDROGEL SZ9

## (undated) DEVICE — SOL NACL IRR 1000ML BTL

## (undated) DEVICE — SUT ETHILON 3-0 PS2 18 BLK

## (undated) DEVICE — GOWN POLY REINF X-LONG 2XL

## (undated) DEVICE — DRESSING XEROFORM 1X8IN

## (undated) DEVICE — ELECTRODE PATIENT RETURN DISP